# Patient Record
Sex: MALE | Race: WHITE | NOT HISPANIC OR LATINO | URBAN - METROPOLITAN AREA
[De-identification: names, ages, dates, MRNs, and addresses within clinical notes are randomized per-mention and may not be internally consistent; named-entity substitution may affect disease eponyms.]

---
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
## 2022-02-04 ENCOUNTER — APPOINTMENT (OUTPATIENT)
Dept: FAMILY MEDICINE | Facility: CLINIC | Age: 22
End: 2022-02-04
Payer: MEDICAID

## 2022-02-04 VITALS
DIASTOLIC BLOOD PRESSURE: 70 MMHG | HEIGHT: 69 IN | SYSTOLIC BLOOD PRESSURE: 122 MMHG | RESPIRATION RATE: 16 BRPM | HEART RATE: 105 BPM | BODY MASS INDEX: 31.7 KG/M2 | WEIGHT: 214 LBS | OXYGEN SATURATION: 97 %

## 2022-02-04 DIAGNOSIS — L01.00 IMPETIGO, UNSPECIFIED: ICD-10-CM

## 2022-02-04 DIAGNOSIS — H53.9 UNSPECIFIED VISUAL DISTURBANCE: ICD-10-CM

## 2022-02-04 DIAGNOSIS — Z83.2 FAMILY HISTORY OF DISEASES OF THE BLOOD AND BLOOD-FORMING ORGANS AND CERTAIN DISORDERS INVOLVING THE IMMUNE MECHANISM: ICD-10-CM

## 2022-02-04 DIAGNOSIS — K29.50 UNSPECIFIED CHRONIC GASTRITIS W/OUT BLEEDING: ICD-10-CM

## 2022-02-04 PROCEDURE — 99214 OFFICE O/P EST MOD 30 MIN: CPT

## 2022-02-04 RX ORDER — FAMOTIDINE 20 MG/1
20 TABLET, FILM COATED ORAL
Qty: 60 | Refills: 1 | Status: DISCONTINUED | COMMUNITY
Start: 2021-08-13 | End: 2022-02-04

## 2022-02-04 NOTE — REVIEW OF SYSTEMS
[Fever] : no fever [Earache] : no earache [Shortness Of Breath] : no shortness of breath [Abdominal Pain] : no abdominal pain [Heartburn] : heartburn

## 2022-02-04 NOTE — HISTORY OF PRESENT ILLNESS
[FreeTextEntry1] : Patient is here to get tb test  .  pt need form completed for jaida, had cpe in 7/21, pt saw cardio and given reassurance, pt was told cardio said likely gi  [de-identified] : pt w recent vision disturbance in both eyes, and headache, \par Patient with rash on chest after vigorous scratching area that was itchy

## 2022-03-07 LAB
DNA PLOIDY SPEC FC-IMP: NORMAL
M TB IFN-G BLD-IMP: NEGATIVE
QUANTIFERON TB PLUS MITOGEN MINUS NIL: 10 IU/ML
QUANTIFERON TB PLUS NIL: 0 IU/ML
QUANTIFERON TB PLUS TB1 MINUS NIL: 0 IU/ML
QUANTIFERON TB PLUS TB2 MINUS NIL: 0 IU/ML

## 2022-03-08 ENCOUNTER — APPOINTMENT (OUTPATIENT)
Dept: FAMILY MEDICINE | Facility: CLINIC | Age: 22
End: 2022-03-08
Payer: MEDICAID

## 2022-03-08 VITALS
DIASTOLIC BLOOD PRESSURE: 76 MMHG | RESPIRATION RATE: 12 BRPM | WEIGHT: 215 LBS | OXYGEN SATURATION: 94 % | HEART RATE: 92 BPM | TEMPERATURE: 97.8 F | SYSTOLIC BLOOD PRESSURE: 110 MMHG

## 2022-03-08 DIAGNOSIS — H53.9 UNSPECIFIED VISUAL DISTURBANCE: ICD-10-CM

## 2022-03-08 DIAGNOSIS — M79.89 OTHER SPECIFIED SOFT TISSUE DISORDERS: ICD-10-CM

## 2022-03-08 LAB
BILIRUB UR QL STRIP: NORMAL
GLUCOSE UR-MCNC: NORMAL
HCG UR QL: 0.2 EU/DL
HGB UR QL STRIP.AUTO: NORMAL
KETONES UR-MCNC: NORMAL
LEUKOCYTE ESTERASE UR QL STRIP: NORMAL
NITRITE UR QL STRIP: NORMAL
PH UR STRIP: 5.5
PROT UR STRIP-MCNC: NORMAL
SP GR UR STRIP: 1.03

## 2022-03-08 PROCEDURE — 81003 URINALYSIS AUTO W/O SCOPE: CPT | Mod: QW

## 2022-03-08 PROCEDURE — 99214 OFFICE O/P EST MOD 30 MIN: CPT

## 2022-03-08 NOTE — PHYSICAL EXAM
[No Acute Distress] : no acute distress [Supple] : supple [Clear to Auscultation] : lungs were clear to auscultation bilaterally [Normal S1, S2] : normal S1 and S2 [Soft] : abdomen soft [de-identified] : pos sweling in r lat abd wall , appears to be lipoma , soft

## 2022-03-08 NOTE — HISTORY OF PRESENT ILLNESS
[FreeTextEntry1] : Patient is here to follow up on lab results and lookingf for help sleeping,. pt note sproblem fall asleep, pt needs form fillled outr for helatrh screen, pt notes no cp, no sob, pt labs reveal tb was neg and factor 5 was hetero , ptmhas no personal hx of clots\par  [de-identified] : pt notes sonme r side abd sorenbess he has tenderness at times and last summer was referred to dr little to see if something could be done w it but he nevewr went

## 2022-03-11 LAB
APPEARANCE: CLEAR
BACTERIA: NEGATIVE
BILIRUBIN URINE: NEGATIVE
BLOOD URINE: NORMAL
COLOR: YELLOW
GLUCOSE QUALITATIVE U: NEGATIVE
HYALINE CASTS: 1 /LPF
KETONES URINE: NEGATIVE
LEUKOCYTE ESTERASE URINE: NEGATIVE
MICROSCOPIC-UA: NORMAL
NITRITE URINE: NEGATIVE
PH URINE: 5.5
PROTEIN URINE: NEGATIVE
RED BLOOD CELLS URINE: 1 /HPF
SPECIFIC GRAVITY URINE: 1.02
SQUAMOUS EPITHELIAL CELLS: 0 /HPF
UROBILINOGEN URINE: NORMAL
WHITE BLOOD CELLS URINE: 1 /HPF

## 2022-04-01 ENCOUNTER — RX RENEWAL (OUTPATIENT)
Age: 22
End: 2022-04-01

## 2022-05-11 NOTE — ED STATDOCS - TEMPLATE, MLM
TC to patient      PET shows uptake in the right hilar area with SUV 3.7 and \"numerous node in the right neck\". Dominant one is at level IIA , 12 mm, SUV 5.0. Clinically he has no new symptoms. Options are to repeat CT with contrast of neck and chest in 3 mo, or to biopsy one or both areas. Will discuss in lung TB on 5/18 and get back to him.
Abdominal pain

## 2022-05-12 ENCOUNTER — APPOINTMENT (OUTPATIENT)
Dept: SURGERY | Facility: CLINIC | Age: 22
End: 2022-05-12
Payer: MEDICAID

## 2022-05-12 VITALS
TEMPERATURE: 97.6 F | HEART RATE: 62 BPM | OXYGEN SATURATION: 96 % | SYSTOLIC BLOOD PRESSURE: 119 MMHG | HEIGHT: 69 IN | WEIGHT: 218 LBS | DIASTOLIC BLOOD PRESSURE: 79 MMHG | BODY MASS INDEX: 32.29 KG/M2

## 2022-05-12 PROCEDURE — 99203 OFFICE O/P NEW LOW 30 MIN: CPT

## 2022-05-12 NOTE — CONSULT LETTER
[Dear  ___] : Dear  [unfilled], [Consult Letter:] : I had the pleasure of evaluating your patient, [unfilled]. [Please see my note below.] : Please see my note below. [Consult Closing:] : Thank you very much for allowing me to participate in the care of this patient.  If you have any questions, please do not hesitate to contact me. [Sincerely,] : Sincerely, [FreeTextEntry3] : Campbell Thomas MD, FACS\par  \par Department of Surgery\par [facility]\par [facility]\par

## 2022-05-12 NOTE — PHYSICAL EXAM
[JVD] : no jugular venous distention  [Purpura] : no purpura  [Petechiae] : no petechiae [Skin Ulcer] : no ulcer [Skin Induration] : no induration [Alert] : alert [Oriented to Person] : oriented to person [Oriented to Place] : oriented to place [Oriented to Time] : oriented to time [Calm] : calm [de-identified] : non toxic, in no acute distress  [de-identified] : NC/AT PERRL EOMI no scleral icterus  [de-identified] : trachea midline, no gross mass  [de-identified] : no audible wheezing or stridor  [de-identified] : obese soft, no localizing tenderness, no guarding, no rebound,  [de-identified] : FROM  of all extremities with no gross deformity or angulation, there is no abdominal wall herniation  [de-identified] : there is a large rubbery semi mobile mass of the right lower abdominal wall that feels most like a lipoma  [de-identified] : mood is calm

## 2022-05-12 NOTE — ASSESSMENT
[FreeTextEntry1] : The patient is a 22 year old male with a mass of the right lower abdominal wall that is most likely a lipoma just above the anterior abdominal wall fascia.  The patient has been advised that given the disparity in the imaging already performed I will proceed with an MRI to further assess if this is consistent with a lipoma or potentially a more concerning neoplasm.  He and his mother are in agreement and will follow up upon completion of the MRi for further recommendations regarding excision. A total of 40 minutes was spent coordinating the patient's care.

## 2022-05-12 NOTE — HISTORY OF PRESENT ILLNESS
[de-identified] : The patient comes to the office in consultation by Dr. Mario Jauregui for evaluation of a mass of the right lower abdominal wall.  The patient reports that the mass has been present for at least 2 to 3 years.  The patient reports that at one point he had pain prompting him to go to the ER.  He had been experiencing a burning pain in the right lower abdominal area and at one point was worked up to rule out appendicitis.  Imaging at that time suggested fat necrosis or inflammation of the right lower abdominal wall.

## 2022-05-12 NOTE — DATA REVIEWED
[FreeTextEntry1] : Independent review of the abdominal ultrasound suggest a lipoma 9.7 by 4 by 9.4 cm in size \par \par Independent review of the abd/pel CT scan reveals an area of inflammation and/or fat necrosis of the right lower abdominal wall

## 2022-06-28 ENCOUNTER — APPOINTMENT (OUTPATIENT)
Dept: MRI IMAGING | Facility: CLINIC | Age: 22
End: 2022-06-28

## 2022-06-28 ENCOUNTER — OUTPATIENT (OUTPATIENT)
Dept: OUTPATIENT SERVICES | Facility: HOSPITAL | Age: 22
LOS: 1 days | End: 2022-06-28
Payer: MEDICAID

## 2022-06-28 DIAGNOSIS — Z00.8 ENCOUNTER FOR OTHER GENERAL EXAMINATION: ICD-10-CM

## 2022-06-28 PROCEDURE — 74183 MRI ABD W/O CNTR FLWD CNTR: CPT | Mod: 26

## 2022-06-28 PROCEDURE — 74183 MRI ABD W/O CNTR FLWD CNTR: CPT

## 2022-06-28 PROCEDURE — A9585: CPT

## 2022-07-11 ENCOUNTER — APPOINTMENT (OUTPATIENT)
Dept: SURGERY | Facility: CLINIC | Age: 22
End: 2022-07-11

## 2022-07-11 VITALS
HEIGHT: 69 IN | OXYGEN SATURATION: 98 % | HEART RATE: 72 BPM | WEIGHT: 221 LBS | DIASTOLIC BLOOD PRESSURE: 85 MMHG | BODY MASS INDEX: 32.73 KG/M2 | RESPIRATION RATE: 12 BRPM | TEMPERATURE: 97.1 F | SYSTOLIC BLOOD PRESSURE: 132 MMHG

## 2022-07-11 DIAGNOSIS — R19.03 RIGHT LOWER QUADRANT ABDOMINAL SWELLING, MASS AND LUMP: ICD-10-CM

## 2022-07-11 PROCEDURE — 99214 OFFICE O/P EST MOD 30 MIN: CPT

## 2022-07-11 NOTE — HISTORY OF PRESENT ILLNESS
[de-identified] : The patient comes to the office with no new complaints.  He has no pain and has noted no growth of the mass in the right lower abdominal wall. He has no abdominal pain, nausea, or vomit.

## 2022-07-11 NOTE — PHYSICAL EXAM
[JVD] : no jugular venous distention  [Purpura] : no purpura  [Petechiae] : no petechiae [Skin Ulcer] : no ulcer [Skin Induration] : no induration [Alert] : alert [Oriented to Person] : oriented to person [Oriented to Place] : oriented to place [Oriented to Time] : oriented to time [Calm] : calm [de-identified] : non toxic, in no acute distress  [de-identified] : NC/AT PERRL EOMI no scleral icterus  [de-identified] : trachea midline, no gross mass  [de-identified] : no audible wheezing or stridor  [de-identified] : obese soft, no localizing tenderness, no guarding, no rebound,  [de-identified] : FROM  of all extremities with no gross deformity or angulation, there is no abdominal wall herniation  [de-identified] : there remains a large rubbery semi mobile mass of the right lower abdominal wall that feels most like a lipoma  [de-identified] : mood is calm

## 2022-07-11 NOTE — DATA REVIEWED
[FreeTextEntry1] : Independent review of the abd/pel CT scan reveals a predominantly lipomatous mass of the right lower abdominal wall with enhancement and vascularity, most likely a lipoma however liposarcoma can not be excluded.

## 2022-07-11 NOTE — ASSESSMENT
[FreeTextEntry1] : The patient is a 22 year old male with a lipomatous mass of the right lower abdominal wall.  Based on MRI imaging, and presence of vascularity, liposarcoma can not be excluded.  I have suggested an evaluation with Surgical Oncology given the potential this may represent a liposarcoma.  The patient and his mother admitted understanding.  They will contact Surgical Oncology for evaluation.  A total of 30 minutes was spent coordinating the patient's care.

## 2022-07-21 ENCOUNTER — APPOINTMENT (OUTPATIENT)
Dept: SURGICAL ONCOLOGY | Facility: CLINIC | Age: 22
End: 2022-07-21

## 2022-07-21 VITALS
WEIGHT: 221 LBS | OXYGEN SATURATION: 96 % | BODY MASS INDEX: 32.73 KG/M2 | HEIGHT: 69 IN | HEART RATE: 62 BPM | SYSTOLIC BLOOD PRESSURE: 113 MMHG | DIASTOLIC BLOOD PRESSURE: 75 MMHG

## 2022-07-21 DIAGNOSIS — D17.9 BENIGN LIPOMATOUS NEOPLASM, UNSPECIFIED: ICD-10-CM

## 2022-07-21 PROCEDURE — 99203 OFFICE O/P NEW LOW 30 MIN: CPT

## 2022-07-22 NOTE — HISTORY OF PRESENT ILLNESS
[de-identified] : 22 year old male with history of right lower abdominal wall mass. He reports he initially noted it about 2-3 years ago. He reports having pain that did not resolve and reports presenting to ER. He reports he continues to have burning pain and had workup for appendicitis. During the work up he had CT on 2/22/20 and US done on 3/10/20 that showed Ultrasound overlying area of concern in the right lower quadrant demonstrates a subcutaneous well-circumscribed isoechoic lesion measuring 9.7 x 2.5 x 9.8 cm most suggestive of a lipoma corresponding to findings  on  recent CT.\par \par   He then followed up with general surgery Dr. Thomas and recommended to have MRI done.   He then had MRI done that showed The predominantly adipose mass measures 10.5 x 9.8 x 4.3 cm in craniocaudal, right/left and ap dimensions respectively. This lesion was seen on prior CT abdomen/pelvis dated 2/22/2020 and previously measured approximately 9.1 x 8.1 x 2.9 cm.  Redemonstration of a predominantly lipomatous mass in the right lateral abdominal wall which has slightly increased in size when compared to prior study dated 2020 accounting for differences in technique. The lesion demonstrates no signs of acute inflammation or septation and likely represents a lipoma, however given the prominent internal and peripheral vessels and the subtle enhancement within the mass as well as the interval growth, a well differentiated liposarcoma cannot be fully excluded.   \par \par He followed up with Dr. Thomas, he referred patient to Surg/ Onc. to discuss possible resection of the mass.  He denies having any recent fever, chills, n/v. He denies having any pain and reports he does not think the mass has increased in size.\par \par He reports he was recently diagnosed with Factor V deficiency. He got tested after his father and sister were diagnosed. He follows up with hematologist but unsure of the name. He is aware to obtain clearance from hematologist prior to the surgery.\par \par Patient sates she has been feeling well overall. Appetite has been good no unintentional weight loss.\par Denies any recent SOB, CP, fever, chills, n/v/d. \par \par He reports having hallucinations after taking Pepcid and he took seasonal allergy medication in the past and states it made him depressed. \par

## 2022-07-22 NOTE — ASSESSMENT
[FreeTextEntry1] : 22 year old man who reports 2 years of noticing a mass on the right lower quadrant of the abdominal wall. He reports that in the last several months it has grown. An MRI performed is consistent with either a lipoma or a low-grade liposarcoma, as are the physical exam findings. \par \par I d/w the patient and his mother the R/B/A's of surgical excision. We discussed the need for a wide excision to include a cuff or margin of normal tissue circumferentially. I explained in detail how this could include partial excision of the patient's muscular fascia or muscle fibers. We discussed possible sensory deficits in the area that could be long-lasting. We discussed a 4-6 week recovery where he would need to avoid strenuous activity. We discussed the possible need for a surgical drain for 1-2 weeks. We discussed risks of hematoma, seroma, wound-healing problems, recurrence, positive surgical margin. The patient and his mother expressed understanding and would like to proceed.

## 2022-07-22 NOTE — PHYSICAL EXAM
[Normal] : oriented to person, place and time, with appropriate affect [de-identified] : in the right lower quadrant of the abdomen, in the abdominal wall soft tissues, is a large, compressible, vaguely bounded mass c/w a lipoma of approximately 15cm, c/w either a lipoma or low grade liposarcoma

## 2022-08-03 ENCOUNTER — OUTPATIENT (OUTPATIENT)
Dept: OUTPATIENT SERVICES | Facility: HOSPITAL | Age: 22
LOS: 1 days | End: 2022-08-03
Payer: COMMERCIAL

## 2022-08-03 VITALS
DIASTOLIC BLOOD PRESSURE: 74 MMHG | WEIGHT: 220.02 LBS | OXYGEN SATURATION: 98 % | TEMPERATURE: 98 F | HEART RATE: 75 BPM | HEIGHT: 69 IN | SYSTOLIC BLOOD PRESSURE: 110 MMHG

## 2022-08-03 DIAGNOSIS — R19.03 RIGHT LOWER QUADRANT ABDOMINAL SWELLING, MASS AND LUMP: ICD-10-CM

## 2022-08-03 DIAGNOSIS — Z01.818 ENCOUNTER FOR OTHER PREPROCEDURAL EXAMINATION: ICD-10-CM

## 2022-08-03 DIAGNOSIS — Z29.9 ENCOUNTER FOR PROPHYLACTIC MEASURES, UNSPECIFIED: ICD-10-CM

## 2022-08-03 DIAGNOSIS — Z13.89 ENCOUNTER FOR SCREENING FOR OTHER DISORDER: ICD-10-CM

## 2022-08-03 DIAGNOSIS — Z98.890 OTHER SPECIFIED POSTPROCEDURAL STATES: Chronic | ICD-10-CM

## 2022-08-03 LAB
ALBUMIN SERPL ELPH-MCNC: 4.6 G/DL — SIGNIFICANT CHANGE UP (ref 3.3–5.2)
ALP SERPL-CCNC: 107 U/L — SIGNIFICANT CHANGE UP (ref 40–120)
ALT FLD-CCNC: 13 U/L — SIGNIFICANT CHANGE UP
ANION GAP SERPL CALC-SCNC: 11 MMOL/L — SIGNIFICANT CHANGE UP (ref 5–17)
APPEARANCE UR: CLEAR — SIGNIFICANT CHANGE UP
APTT BLD: 31.4 SEC — SIGNIFICANT CHANGE UP (ref 27.5–35.5)
AST SERPL-CCNC: 19 U/L — SIGNIFICANT CHANGE UP
BACTERIA # UR AUTO: NEGATIVE — SIGNIFICANT CHANGE UP
BASOPHILS # BLD AUTO: 0.03 K/UL — SIGNIFICANT CHANGE UP (ref 0–0.2)
BASOPHILS NFR BLD AUTO: 0.5 % — SIGNIFICANT CHANGE UP (ref 0–2)
BILIRUB SERPL-MCNC: 0.4 MG/DL — SIGNIFICANT CHANGE UP (ref 0.4–2)
BILIRUB UR-MCNC: NEGATIVE — SIGNIFICANT CHANGE UP
BLD GP AB SCN SERPL QL: SIGNIFICANT CHANGE UP
BUN SERPL-MCNC: 11.6 MG/DL — SIGNIFICANT CHANGE UP (ref 8–20)
CALCIUM SERPL-MCNC: 9.1 MG/DL — SIGNIFICANT CHANGE UP (ref 8.4–10.5)
CHLORIDE SERPL-SCNC: 103 MMOL/L — SIGNIFICANT CHANGE UP (ref 98–107)
CO2 SERPL-SCNC: 26 MMOL/L — SIGNIFICANT CHANGE UP (ref 22–29)
COLOR SPEC: YELLOW — SIGNIFICANT CHANGE UP
CREAT SERPL-MCNC: 0.87 MG/DL — SIGNIFICANT CHANGE UP (ref 0.5–1.3)
DIFF PNL FLD: ABNORMAL
EGFR: 127 ML/MIN/1.73M2 — SIGNIFICANT CHANGE UP
EOSINOPHIL # BLD AUTO: 0.06 K/UL — SIGNIFICANT CHANGE UP (ref 0–0.5)
EOSINOPHIL NFR BLD AUTO: 1 % — SIGNIFICANT CHANGE UP (ref 0–6)
EPI CELLS # UR: NEGATIVE — SIGNIFICANT CHANGE UP
GLUCOSE SERPL-MCNC: 86 MG/DL — SIGNIFICANT CHANGE UP (ref 70–99)
GLUCOSE UR QL: NEGATIVE MG/DL — SIGNIFICANT CHANGE UP
HCT VFR BLD CALC: 46.7 % — SIGNIFICANT CHANGE UP (ref 39–50)
HGB BLD-MCNC: 15.5 G/DL — SIGNIFICANT CHANGE UP (ref 13–17)
IMM GRANULOCYTES NFR BLD AUTO: 0.2 % — SIGNIFICANT CHANGE UP (ref 0–1.5)
INR BLD: 1 RATIO — SIGNIFICANT CHANGE UP (ref 0.88–1.16)
KETONES UR-MCNC: NEGATIVE — SIGNIFICANT CHANGE UP
LEUKOCYTE ESTERASE UR-ACNC: NEGATIVE — SIGNIFICANT CHANGE UP
LYMPHOCYTES # BLD AUTO: 1.79 K/UL — SIGNIFICANT CHANGE UP (ref 1–3.3)
LYMPHOCYTES # BLD AUTO: 30.9 % — SIGNIFICANT CHANGE UP (ref 13–44)
MCHC RBC-ENTMCNC: 29.6 PG — SIGNIFICANT CHANGE UP (ref 27–34)
MCHC RBC-ENTMCNC: 33.2 GM/DL — SIGNIFICANT CHANGE UP (ref 32–36)
MCV RBC AUTO: 89.1 FL — SIGNIFICANT CHANGE UP (ref 80–100)
MONOCYTES # BLD AUTO: 0.54 K/UL — SIGNIFICANT CHANGE UP (ref 0–0.9)
MONOCYTES NFR BLD AUTO: 9.3 % — SIGNIFICANT CHANGE UP (ref 2–14)
NEUTROPHILS # BLD AUTO: 3.36 K/UL — SIGNIFICANT CHANGE UP (ref 1.8–7.4)
NEUTROPHILS NFR BLD AUTO: 58.1 % — SIGNIFICANT CHANGE UP (ref 43–77)
NITRITE UR-MCNC: NEGATIVE — SIGNIFICANT CHANGE UP
PH UR: 6 — SIGNIFICANT CHANGE UP (ref 5–8)
PLATELET # BLD AUTO: 277 K/UL — SIGNIFICANT CHANGE UP (ref 150–400)
POTASSIUM SERPL-MCNC: 4.2 MMOL/L — SIGNIFICANT CHANGE UP (ref 3.5–5.3)
POTASSIUM SERPL-SCNC: 4.2 MMOL/L — SIGNIFICANT CHANGE UP (ref 3.5–5.3)
PROT SERPL-MCNC: 7.4 G/DL — SIGNIFICANT CHANGE UP (ref 6.6–8.7)
PROT UR-MCNC: NEGATIVE — SIGNIFICANT CHANGE UP
PROTHROM AB SERPL-ACNC: 11.6 SEC — SIGNIFICANT CHANGE UP (ref 10.5–13.4)
RBC # BLD: 5.24 M/UL — SIGNIFICANT CHANGE UP (ref 4.2–5.8)
RBC # FLD: 12.4 % — SIGNIFICANT CHANGE UP (ref 10.3–14.5)
RBC CASTS # UR COMP ASSIST: NEGATIVE /HPF — SIGNIFICANT CHANGE UP (ref 0–4)
SODIUM SERPL-SCNC: 140 MMOL/L — SIGNIFICANT CHANGE UP (ref 135–145)
SP GR SPEC: 1.02 — SIGNIFICANT CHANGE UP (ref 1.01–1.02)
UROBILINOGEN FLD QL: NEGATIVE MG/DL — SIGNIFICANT CHANGE UP
WBC # BLD: 5.79 K/UL — SIGNIFICANT CHANGE UP (ref 3.8–10.5)
WBC # FLD AUTO: 5.79 K/UL — SIGNIFICANT CHANGE UP (ref 3.8–10.5)
WBC UR QL: NEGATIVE /HPF — SIGNIFICANT CHANGE UP (ref 0–5)

## 2022-08-03 PROCEDURE — G0463: CPT

## 2022-08-03 RX ORDER — CEFAZOLIN SODIUM 1 G
2000 VIAL (EA) INJECTION ONCE
Refills: 0 | Status: DISCONTINUED | OUTPATIENT
Start: 2022-08-15 | End: 2022-08-30

## 2022-08-03 NOTE — H&P PST ADULT - HISTORY OF PRESENT ILLNESS
23 yo M PMH of right lower abdominal wall mass. He reports he initially noted it about 2-3 years ago. He reports having pain that did not resolve and reports presenting to ER. He reports he continues to have burning pain and had workup for appendicitis. During the workup he had CT on 2/22/2020 and US done on 3/10/2020. Ultrasound showed a subcutaneous well-circumscribed isoechoic lesion measuring 9.7 x 2.5 x 9.8 cm in the right lower quadrant most suggestive of a lipoma corresponding to findings on recent CT. He then followed up with general surgery Dr Thomas and had MRI done that showed predominantly adipose mass measuring 10.5 x 9.8 x 4.3 cm in craniocaudal, right/left and AP dimensions respectively. This lesion was seen on prior CT abdomen/pelvis dated 2/22/2020 and previously measured approximately 9.1 x 8.1 x 2.9 cm. Redemonstration of a predominantly lipomatous mass in the right lateral abdominal wall which has slightly increased in size when compared to prior study from 2020 accounting for differences in technique. The lesion demonstrates no signs of acute inflammation or septation and likely represents a lipoma, however given the prominent internal and peripheral vessels and the subtle enhancement within the mass as well as the interval growth, a well differentiated liposarcoma cannot be fully excluded. Patient reports he was recently diagnosed with Factor V deficiency. FHx of Factor V deficiency in father and sister. Patient sates he has been feeling well overall. Denies any recent pain, SOB, chest pain, fevers, chills, n/v/d/c, loss of appetite or unintentional wt loss. Preop assessment prior to radical resection soft tissue mass abdominal w/Dr Holmwitch scheduled for 8/15/2022   23 yo M PMH of Factor V deficiency, seasonal allergies, presents with c/o right lower abdominal wall mass. He reports he initially noted it about 2-3 years ago. He reports having pain that did not resolve, he presented to ER and had workup for appendicitis. During the workup he had CT on 2/22/2020 and US on 3/10/2020. Ultrasound showed a subcutaneous well-circumscribed isoechoic lesion measuring 9.7 x 2.5 x 9.8 cm in the right lower quadrant most suggestive of a lipoma corresponding to findings on CT. He followed up with general surgery Dr Thomas and had MRI done that showed predominantly adipose mass measuring 10.5 x 9.8 x 4.3 cm in craniocaudal, right/left and AP dimensions respectively. This lesion was seen on prior CT abdomen/pelvis dated 2/22/2020 and previously measured approximately 9.1 x 8.1 x 2.9 cm. Re-demonstration of a predominantly lipomatous mass in the right lateral abdominal wall which has slightly increased in size when compared to prior study from 2020 accounting for differences in technique. The lesion demonstrates no signs of acute inflammation or septation and likely represents a lipoma, however given the prominent internal and peripheral vessels and the subtle enhancement within the mass as well as the interval growth, a well differentiated liposarcoma cannot be fully excluded. Patient reports he was recently diagnosed with Factor V deficiency. FHx of Factor V deficiency in father and sister. Patient sates he has been feeling well overall. Denies any recent pain, SOB, chest pain, fevers, chills, n/v/d/c, loss of appetite or unintentional wt loss. Preop assessment prior to radical resection soft tissue mass abdominal wall w/Dr Turk scheduled for 8/15/2022

## 2022-08-03 NOTE — H&P PST ADULT - PROBLEM SELECTOR PLAN 1
preop assessment, hematology clearance pending, radical resection soft tissue mass abdominal w/Dr Turk scheduled for 8/15/2022 preop assessment, hematology clearance pending, radical resection soft tissue mass abdominal wall w/Dr Turk scheduled for 8/15/2022

## 2022-08-03 NOTE — H&P PST ADULT - PROBLEM SELECTOR PLAN 2
caprini score 8, high risk for dvt, SCD ordered, surgical team to assess for dvt prophylaxis caprini score 9, high risk for dvt, SCD ordered, surgical team to assess for dvt prophylaxis

## 2022-08-03 NOTE — H&P PST ADULT - ASSESSMENT
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 Preop assessment prior to radical resection soft tissue mass abdominal w/Dr Turk scheduled for 8/15/2022    Pt was educated on preop preparation with written and verbal instructions. Pt was informed to obtain clearance >3 days before surgery. Pt will review medications with PCP. Pt was educated on NSAIDs, multivitamins and herbals that increase the risk of bleeding and need to be stopped 7 days before procedure. Pt was educated on covid testing and covid prevention, i.e. social distancing, handwashing, mask wearing. Pt verbalized understanding of the above.     OPIOID RISK TOOL    LAUREL EACH BOX THAT APPLIES AND ADD TOTALS AT THE END    FAMILY HISTORY OF SUBSTANCE ABUSE                 FEMALE         MALE                                                Alcohol                             [  ]1 pt          [  ]3pts                                               Illegal Durgs                     [  ]2 pts        [  ]3pts                                               Rx Drugs                           [  ]4 pts        [  ]4 pts    PERSONAL HISTORY OF SUBSTANCE ABUSE                                                                                          Alcohol                             [  ]3 pts       [  ]3 pts                                               Illegal Drugs                     [  ]4 pts        [  ]4 pts                                               Rx Drugs                           [  ]5 pts        [  ]5 pts    AGE BETWEEN 16-45 YEARS                                      [  ]1 pt         [ x ]1 pt    HISTORY OF PREADOLESCENT   SEXUAL ABUSE                                                             [  ]3 pts        [  ]0pts    PSYCHOLOGICAL DISEASE                     ADD, OCD, Bipolar, Schizophrenia        [  ]2 pts         [  ]2 pts                      Depression                                               [  ]1 pt           [  ]1 pt           SCORING TOTAL   (add numbers and type here)              ( 1 )                                     A score of 3 or lower indicated LOW risk for future opioid abuse  A score of 4 to 7 indicated moderate risk for future opioid abuse  A score of 8 or higher indicates a high risk for opioid abuse    CAPRINI VTE 2.0 SCORE [CLOT updated 2019]    AGE RELATED RISK FACTORS                                                       MOBILITY RELATED FACTORS  [ ] Age 41-60 years                                            (1 Point)                    [ ] Bed rest                                                        (1 Point)  [ ] Age: 61-74 years                                           (2 Points)                  [ ] Plaster cast                                                   (2 Points)  [ ] Age= 75 years                                              (3 Points)                    [ ] Bed bound for more than 72 hours                 (2 Points)    DISEASE RELATED RISK FACTORS                                               GENDER SPECIFIC FACTORS  [ ] Edema in the lower extremities                       (1 Point)              [ ] Pregnancy                                                     (1 Point)  [ ] Varicose veins                                               (1 Point)                     [ ] Post-partum < 6 weeks                                   (1 Point)             [x ] BMI > 25 Kg/m2                                            (1 Point)                     [ ] Hormonal therapy  or oral contraception          (1 Point)                 [ ] Sepsis (in the previous month)                        (1 Point)               [ ] History of pregnancy complications                 (1 point)  [ ] Pneumonia or serious lung disease                                               [ ] Unexplained or recurrent                     (1 Point)           (in the previous month)                               (1 Point)  [ ] Abnormal pulmonary function test                     (1 Point)                 SURGERY RELATED RISK FACTORS  [ ] Acute myocardial infarction                              (1 Point)               [ ]  Section                                             (1 Point)  [ ] Congestive heart failure (in the previous month)  (1 Point)      [ ] Minor surgery                                                  (1 Point)   [ ] Inflammatory bowel disease                             (1 Point)               [ ] Arthroscopic surgery                                        (2 Points)  [ ] Central venous access                                      (2 Points)                [x ] General surgery lasting more than 45 minutes (2 points)  [x ] Malignancy- Present or previous                   (2 Points)                [ ] Elective arthroplasty                                         (5 points)    [ ] Stroke (in the previous month)                          (5 Points)                                                                                                                                                           HEMATOLOGY RELATED FACTORS                                                 TRAUMA RELATED RISK FACTORS  [ ] Prior episodes of VTE                                     (3 Points)                [ ] Fracture of the hip, pelvis, or leg                       (5 Points)  [ ] Positive family history for VTE                         (3 Points)             [ ] Acute spinal cord injury (in the previous month)  (5 Points)  [ ] Prothrombin 15955 A                                     (3 Points)               [ ] Paralysis  (less than 1 month)                             (5 Points)  [x ] Factor V Leiden                                             (3 Points)                  [ ] Multiple Trauma within 1 month                        (5 Points)  [ ] Lupus anticoagulants                                     (3 Points)                                                           [ ] Anticardiolipin antibodies                               (3 Points)                                                       [ ] High homocysteine in the blood                      (3 Points)                                             [ ] Other congenital or acquired thrombophilia      (3 Points)                                                [ ] Heparin induced thrombocytopenia                  (3 Points)                                     Total Score [    8      ] 21 yo M presents with c/o right lower abdominal wall mass. Preop assessment prior to radical resection soft tissue mass abdominal wall w/Dr Turk scheduled for 8/15/2022    Pt was educated on preop preparation with written and verbal instructions. Pt was informed to obtain hematology clearance >3 days before surgery. Pt was educated on NSAIDs, multivitamins and herbals that increase the risk of bleeding and need to be stopped 7 days before procedure. Pt was educated on covid testing and covid prevention, i.e. social distancing, handwashing, mask wearing. Pt verbalized understanding of the above.     OPIOID RISK TOOL    LAUREL EACH BOX THAT APPLIES AND ADD TOTALS AT THE END    FAMILY HISTORY OF SUBSTANCE ABUSE                 FEMALE         MALE                                                Alcohol                             [  ]1 pt          [  ]3pts                                               Illegal Durgs                     [  ]2 pts        [  ]3pts                                               Rx Drugs                           [  ]4 pts        [  ]4 pts    PERSONAL HISTORY OF SUBSTANCE ABUSE                                                                                          Alcohol                             [  ]3 pts       [  ]3 pts                                               Illegal Drugs                     [  ]4 pts        [  ]4 pts                                               Rx Drugs                           [  ]5 pts        [  ]5 pts    AGE BETWEEN 16-45 YEARS                                      [  ]1 pt         [ x ]1 pt    HISTORY OF PREADOLESCENT   SEXUAL ABUSE                                                             [  ]3 pts        [  ]0pts    PSYCHOLOGICAL DISEASE                     ADD, OCD, Bipolar, Schizophrenia        [  ]2 pts         [  ]2 pts                      Depression                                               [  ]1 pt           [  ]1 pt           SCORING TOTAL   (add numbers and type here)              ( 1 )                                     A score of 3 or lower indicated LOW risk for future opioid abuse  A score of 4 to 7 indicated moderate risk for future opioid abuse  A score of 8 or higher indicates a high risk for opioid abuse    CAPRINI VTE 2.0 SCORE [CLOT updated 2019]    AGE RELATED RISK FACTORS                                                       MOBILITY RELATED FACTORS  [ ] Age 41-60 years                                            (1 Point)                    [ ] Bed rest                                                        (1 Point)  [ ] Age: 61-74 years                                           (2 Points)                  [ ] Plaster cast                                                   (2 Points)  [ ] Age= 75 years                                              (3 Points)                    [ ] Bed bound for more than 72 hours                 (2 Points)    DISEASE RELATED RISK FACTORS                                               GENDER SPECIFIC FACTORS  [ ] Edema in the lower extremities                       (1 Point)              [ ] Pregnancy                                                     (1 Point)  [ ] Varicose veins                                               (1 Point)                     [ ] Post-partum < 6 weeks                                   (1 Point)             [x ] BMI > 25 Kg/m2                                            (1 Point)                     [ ] Hormonal therapy  or oral contraception          (1 Point)                 [ ] Sepsis (in the previous month)                        (1 Point)               [ ] History of pregnancy complications                 (1 point)  [ ] Pneumonia or serious lung disease                                               [ ] Unexplained or recurrent                     (1 Point)           (in the previous month)                               (1 Point)  [ ] Abnormal pulmonary function test                     (1 Point)                 SURGERY RELATED RISK FACTORS  [ ] Acute myocardial infarction                              (1 Point)               [ ]  Section                                             (1 Point)  [ ] Congestive heart failure (in the previous month)  (1 Point)      [ ] Minor surgery                                                  (1 Point)   [ ] Inflammatory bowel disease                             (1 Point)               [ ] Arthroscopic surgery                                        (2 Points)  [ ] Central venous access                                      (2 Points)                [x ] General surgery lasting more than 45 minutes (2 points)  [x ] Malignancy- Present or previous                   (2 Points)                [ ] Elective arthroplasty                                         (5 points)    [ ] Stroke (in the previous month)                          (5 Points)                                                                                                                                                           HEMATOLOGY RELATED FACTORS                                                 TRAUMA RELATED RISK FACTORS  [ ] Prior episodes of VTE                                     (3 Points)                [ ] Fracture of the hip, pelvis, or leg                       (5 Points)  [ ] Positive family history for VTE                         (3 Points)             [ ] Acute spinal cord injury (in the previous month)  (5 Points)  [ ] Prothrombin 48353 A                                     (3 Points)               [ ] Paralysis  (less than 1 month)                             (5 Points)  [x ] Factor V Leiden                                             (3 Points)                  [ ] Multiple Trauma within 1 month                        (5 Points)  [ ] Lupus anticoagulants                                     (3 Points)                                                           [ ] Anticardiolipin antibodies                               (3 Points)                                                       [ ] High homocysteine in the blood                      (3 Points)                                             [ ] Other congenital or acquired thrombophilia      (3 Points)                                                [ ] Heparin induced thrombocytopenia                  (3 Points)                                     Total Score [    8      ] 23 yo M presents with c/o right lower abdominal wall mass. Preop assessment prior to radical resection soft tissue mass abdominal wall w/Dr Turk scheduled for 8/15/2022    Pt was educated on preop preparation with written and verbal instructions. Pt was informed to obtain hematology clearance >3 days before surgery. Pt was educated on NSAIDs, multivitamins and herbals that increase the risk of bleeding and need to be stopped 7 days before procedure. Pt was educated on covid testing and covid prevention, i.e. social distancing, handwashing, mask wearing. Pt verbalized understanding of the above.     OPIOID RISK TOOL    LAUREL EACH BOX THAT APPLIES AND ADD TOTALS AT THE END    FAMILY HISTORY OF SUBSTANCE ABUSE                 FEMALE         MALE                                                Alcohol                             [  ]1 pt          [  ]3pts                                               Illegal Durgs                     [  ]2 pts        [  ]3pts                                               Rx Drugs                           [  ]4 pts        [  ]4 pts    PERSONAL HISTORY OF SUBSTANCE ABUSE                                                                                          Alcohol                             [  ]3 pts       [  ]3 pts                                               Illegal Drugs                     [  ]4 pts        [  ]4 pts                                               Rx Drugs                           [  ]5 pts        [  ]5 pts    AGE BETWEEN 16-45 YEARS                                      [  ]1 pt         [ x ]1 pt    HISTORY OF PREADOLESCENT   SEXUAL ABUSE                                                             [  ]3 pts        [  ]0pts    PSYCHOLOGICAL DISEASE                     ADD, OCD, Bipolar, Schizophrenia        [  ]2 pts         [  ]2 pts                      Depression                                               [  ]1 pt           [  ]1 pt           SCORING TOTAL   (add numbers and type here)              ( 1 )                                     A score of 3 or lower indicated LOW risk for future opioid abuse  A score of 4 to 7 indicated moderate risk for future opioid abuse  A score of 8 or higher indicates a high risk for opioid abuse    CAPRINI VTE 2.0 SCORE [CLOT updated 2019]    AGE RELATED RISK FACTORS                                                       MOBILITY RELATED FACTORS  [ ] Age 41-60 years                                            (1 Point)                    [ ] Bed rest                                                        (1 Point)  [ ] Age: 61-74 years                                           (2 Points)                  [ ] Plaster cast                                                   (2 Points)  [ ] Age= 75 years                                              (3 Points)                    [ ] Bed bound for more than 72 hours                 (2 Points)    DISEASE RELATED RISK FACTORS                                               GENDER SPECIFIC FACTORS  [ ] Edema in the lower extremities                       (1 Point)              [ ] Pregnancy                                                     (1 Point)  [ ] Varicose veins                                               (1 Point)                     [ ] Post-partum < 6 weeks                                   (1 Point)             [x ] BMI > 25 Kg/m2                                            (1 Point)                     [ ] Hormonal therapy  or oral contraception          (1 Point)                 [ ] Sepsis (in the previous month)                        (1 Point)               [ ] History of pregnancy complications                 (1 point)  [ ] Pneumonia or serious lung disease                                               [ ] Unexplained or recurrent                     (1 Point)           (in the previous month)                               (1 Point)  [ ] Abnormal pulmonary function test                     (1 Point)                 SURGERY RELATED RISK FACTORS  [ ] Acute myocardial infarction                              (1 Point)               [ ]  Section                                             (1 Point)  [ ] Congestive heart failure (in the previous month)  (1 Point)      [ ] Minor surgery                                                  (1 Point)   [ ] Inflammatory bowel disease                             (1 Point)               [ ] Arthroscopic surgery                                        (2 Points)  [ ] Central venous access                                      (2 Points)                [x ] General surgery lasting more than 45 minutes (2 points)  [ ] Malignancy- Present or previous                   (2 Points)                [ ] Elective arthroplasty                                         (5 points)    [ ] Stroke (in the previous month)                          (5 Points)                                                                                                                                                           HEMATOLOGY RELATED FACTORS                                                 TRAUMA RELATED RISK FACTORS  [ ] Prior episodes of VTE                                     (3 Points)                [ ] Fracture of the hip, pelvis, or leg                       (5 Points)  [ x] Positive family history for VTE                         (3 Points)             [ ] Acute spinal cord injury (in the previous month)  (5 Points)  [ ] Prothrombin 99007 A                                     (3 Points)               [ ] Paralysis  (less than 1 month)                             (5 Points)  [x ] Factor V Leiden                                             (3 Points)                  [ ] Multiple Trauma within 1 month                        (5 Points)  [ ] Lupus anticoagulants                                     (3 Points)                                                           [ ] Anticardiolipin antibodies                               (3 Points)                                                       [ ] High homocysteine in the blood                      (3 Points)                                             [ ] Other congenital or acquired thrombophilia      (3 Points)                                                [ ] Heparin induced thrombocytopenia                  (3 Points)                                     Total Score [    9      ]

## 2022-08-03 NOTE — H&P PST ADULT - NSICDXFAMILYHX_GEN_ALL_CORE_FT
FAMILY HISTORY:  FH: type 1 diabetes mellitus, cousin, great aunt    Father  Still living? Yes, Estimated age: Age Unknown  Family history of coronary artery disease in father, Age at diagnosis: Age Unknown  Family history of factor V deficiency, Age at diagnosis: Age Unknown    Sibling  Still living? Unknown  Family history of DVT, Age at diagnosis: Age Unknown  Family history of factor V deficiency, Age at diagnosis: Age Unknown    Grandparent  Still living? Unknown  FH: kidney cancer, Age at diagnosis: Age Unknown    Aunt  Still living? Unknown  FH: breast cancer, Age at diagnosis: Age Unknown  FH: colon cancer, Age at diagnosis: Age Unknown

## 2022-08-03 NOTE — H&P PST ADULT - NSICDXPASTMEDICALHX_GEN_ALL_CORE_FT
PAST MEDICAL HISTORY:  Environmental allergies     Factor V deficiency     Right lower quadrant abdominal swelling, mass and lump

## 2022-08-12 NOTE — ASU PATIENT PROFILE, ADULT - ABLE TO REACH PT
Pt c/o left-sided chest pain near her left breast that radiates down her left arm. Denies SOB, lightheadedness, or dizziness. Pt states she went to her PMD this morning due to her symptoms chest pain, BLE edema, and bilateral hand edema.  Pt states she had yes

## 2022-08-14 ENCOUNTER — TRANSCRIPTION ENCOUNTER (OUTPATIENT)
Age: 22
End: 2022-08-14

## 2022-08-15 ENCOUNTER — TRANSCRIPTION ENCOUNTER (OUTPATIENT)
Age: 22
End: 2022-08-15

## 2022-08-15 ENCOUNTER — RESULT REVIEW (OUTPATIENT)
Age: 22
End: 2022-08-15

## 2022-08-15 ENCOUNTER — OUTPATIENT (OUTPATIENT)
Dept: INPATIENT UNIT | Facility: HOSPITAL | Age: 22
LOS: 1 days | End: 2022-08-15
Payer: COMMERCIAL

## 2022-08-15 ENCOUNTER — APPOINTMENT (OUTPATIENT)
Dept: SURGICAL ONCOLOGY | Facility: HOSPITAL | Age: 22
End: 2022-08-15

## 2022-08-15 VITALS
OXYGEN SATURATION: 98 % | WEIGHT: 220.02 LBS | RESPIRATION RATE: 16 BRPM | TEMPERATURE: 97 F | SYSTOLIC BLOOD PRESSURE: 135 MMHG | DIASTOLIC BLOOD PRESSURE: 78 MMHG | HEIGHT: 69 IN | HEART RATE: 75 BPM

## 2022-08-15 VITALS
TEMPERATURE: 98 F | OXYGEN SATURATION: 100 % | SYSTOLIC BLOOD PRESSURE: 108 MMHG | DIASTOLIC BLOOD PRESSURE: 60 MMHG | RESPIRATION RATE: 20 BRPM | HEART RATE: 66 BPM

## 2022-08-15 DIAGNOSIS — R19.03 RIGHT LOWER QUADRANT ABDOMINAL SWELLING, MASS AND LUMP: ICD-10-CM

## 2022-08-15 DIAGNOSIS — Z98.890 OTHER SPECIFIED POSTPROCEDURAL STATES: Chronic | ICD-10-CM

## 2022-08-15 PROCEDURE — 88304 TISSUE EXAM BY PATHOLOGIST: CPT | Mod: 26

## 2022-08-15 PROCEDURE — 22905 RAD RESECT ABD TUMOR 5 CM/>: CPT

## 2022-08-15 PROCEDURE — 88304 TISSUE EXAM BY PATHOLOGIST: CPT

## 2022-08-15 PROCEDURE — 22905 RAD RESECT ABD TUMOR 5 CM/>: CPT | Mod: AS

## 2022-08-15 PROCEDURE — C9399: CPT

## 2022-08-15 RX ORDER — HYDROMORPHONE HYDROCHLORIDE 2 MG/ML
0.5 INJECTION INTRAMUSCULAR; INTRAVENOUS; SUBCUTANEOUS
Refills: 0 | Status: DISCONTINUED | OUTPATIENT
Start: 2022-08-15 | End: 2022-08-15

## 2022-08-15 RX ORDER — DIPHENHYDRAMINE HCL 50 MG
0 CAPSULE ORAL
Qty: 0 | Refills: 0 | DISCHARGE

## 2022-08-15 RX ORDER — CETIRIZINE HYDROCHLORIDE 10 MG/1
1 TABLET ORAL
Qty: 0 | Refills: 0 | DISCHARGE

## 2022-08-15 RX ORDER — ACETAMINOPHEN 500 MG
975 TABLET ORAL ONCE
Refills: 0 | Status: COMPLETED | OUTPATIENT
Start: 2022-08-15 | End: 2022-08-15

## 2022-08-15 RX ORDER — ACETAMINOPHEN 500 MG
2 TABLET ORAL
Qty: 0 | Refills: 0 | DISCHARGE

## 2022-08-15 RX ORDER — ONDANSETRON 8 MG/1
4 TABLET, FILM COATED ORAL ONCE
Refills: 0 | Status: DISCONTINUED | OUTPATIENT
Start: 2022-08-15 | End: 2022-08-15

## 2022-08-15 RX ORDER — SODIUM CHLORIDE 9 MG/ML
1000 INJECTION, SOLUTION INTRAVENOUS
Refills: 0 | Status: DISCONTINUED | OUTPATIENT
Start: 2022-08-15 | End: 2022-08-15

## 2022-08-15 RX ADMIN — Medication 975 MILLIGRAM(S): at 13:23

## 2022-08-15 RX ADMIN — Medication 975 MILLIGRAM(S): at 12:53

## 2022-08-15 NOTE — ASU DISCHARGE PLAN (ADULT/PEDIATRIC) - ASU DC SPECIAL INSTRUCTIONSFT
OK to shower day after surgery. Also on day after surgery, it is ok to removed dressing around drain.   Keep drain in place. Strip the drain daily and monitor output. Of output change/increases please call Dr. Turk's office. Please follow up with Dr. Turk in 2 weeks.  Do not pick at glue (Dermadon) around incision.

## 2022-08-15 NOTE — ASU DISCHARGE PLAN (ADULT/PEDIATRIC) - CARE PROVIDER_API CALL
Pascual Turk (MD)  Surgery  [address] [place] [address]  Phone: [phone]  Fax: [phone]  Follow Up Time: 2 weeks

## 2022-08-15 NOTE — BRIEF OPERATIVE NOTE - NSICDXBRIEFPROCEDURE_GEN_ALL_CORE_FT
PROCEDURES:  Radical resection, soft tissue tumor, abdominal wall; 5 cm or greater 15-Aug-2022 17:57:27  Anitha Gaspar

## 2022-08-15 NOTE — ASU DISCHARGE PLAN (ADULT/PEDIATRIC) - NS MD DC FALL RISK RISK
For information on Fall & Injury Prevention, visit: [email or web address] OR  [email or web address] OR  https://www.cdc.gov/steadi/patient.html

## 2022-08-18 PROBLEM — R19.03 RIGHT LOWER QUADRANT ABDOMINAL SWELLING, MASS AND LUMP: Chronic | Status: ACTIVE | Noted: 2022-08-03

## 2022-08-18 PROBLEM — D68.2 HEREDITARY DEFICIENCY OF OTHER CLOTTING FACTORS: Chronic | Status: ACTIVE | Noted: 2022-08-03

## 2022-08-24 ENCOUNTER — RESULT REVIEW (OUTPATIENT)
Age: 22
End: 2022-08-24

## 2022-08-25 LAB — SURGICAL PATHOLOGY STUDY: SIGNIFICANT CHANGE UP

## 2022-08-27 ENCOUNTER — EMERGENCY (EMERGENCY)
Facility: HOSPITAL | Age: 22
LOS: 1 days | Discharge: HOME | End: 2022-08-27
Attending: STUDENT IN AN ORGANIZED HEALTH CARE EDUCATION/TRAINING PROGRAM
Payer: COMMERCIAL

## 2022-08-27 VITALS
SYSTOLIC BLOOD PRESSURE: 126 MMHG | RESPIRATION RATE: 18 BRPM | DIASTOLIC BLOOD PRESSURE: 89 MMHG | WEIGHT: 220.02 LBS | OXYGEN SATURATION: 97 % | HEART RATE: 97 BPM | TEMPERATURE: 99 F | HEIGHT: 69 IN

## 2022-08-27 DIAGNOSIS — Z98.890 OTHER SPECIFIED POSTPROCEDURAL STATES: Chronic | ICD-10-CM

## 2022-08-27 LAB
ALBUMIN SERPL ELPH-MCNC: 4.5 G/DL — SIGNIFICANT CHANGE UP (ref 3.3–5.2)
ALP SERPL-CCNC: 110 U/L — SIGNIFICANT CHANGE UP (ref 40–120)
ALT FLD-CCNC: 31 U/L — SIGNIFICANT CHANGE UP
ANION GAP SERPL CALC-SCNC: 12 MMOL/L — SIGNIFICANT CHANGE UP (ref 5–17)
APPEARANCE UR: CLEAR — SIGNIFICANT CHANGE UP
APTT BLD: 31.4 SEC — SIGNIFICANT CHANGE UP (ref 27.5–35.5)
AST SERPL-CCNC: 19 U/L — SIGNIFICANT CHANGE UP
BACTERIA # UR AUTO: ABNORMAL
BASOPHILS # BLD AUTO: 0.03 K/UL — SIGNIFICANT CHANGE UP (ref 0–0.2)
BASOPHILS NFR BLD AUTO: 0.3 % — SIGNIFICANT CHANGE UP (ref 0–2)
BILIRUB SERPL-MCNC: 0.5 MG/DL — SIGNIFICANT CHANGE UP (ref 0.4–2)
BILIRUB UR-MCNC: NEGATIVE — SIGNIFICANT CHANGE UP
BUN SERPL-MCNC: 11.1 MG/DL — SIGNIFICANT CHANGE UP (ref 8–20)
CALCIUM SERPL-MCNC: 10 MG/DL — SIGNIFICANT CHANGE UP (ref 8.4–10.5)
CHLORIDE SERPL-SCNC: 97 MMOL/L — LOW (ref 98–107)
CO2 SERPL-SCNC: 27 MMOL/L — SIGNIFICANT CHANGE UP (ref 22–29)
COLOR SPEC: YELLOW — SIGNIFICANT CHANGE UP
CREAT SERPL-MCNC: 0.85 MG/DL — SIGNIFICANT CHANGE UP (ref 0.5–1.3)
DIFF PNL FLD: ABNORMAL
EGFR: 128 ML/MIN/1.73M2 — SIGNIFICANT CHANGE UP
EOSINOPHIL # BLD AUTO: 0.07 K/UL — SIGNIFICANT CHANGE UP (ref 0–0.5)
EOSINOPHIL NFR BLD AUTO: 0.8 % — SIGNIFICANT CHANGE UP (ref 0–6)
EPI CELLS # UR: SIGNIFICANT CHANGE UP
GLUCOSE SERPL-MCNC: 89 MG/DL — SIGNIFICANT CHANGE UP (ref 70–99)
GLUCOSE UR QL: NEGATIVE MG/DL — SIGNIFICANT CHANGE UP
HCT VFR BLD CALC: 48.8 % — SIGNIFICANT CHANGE UP (ref 39–50)
HGB BLD-MCNC: 16 G/DL — SIGNIFICANT CHANGE UP (ref 13–17)
IMM GRANULOCYTES NFR BLD AUTO: 0.3 % — SIGNIFICANT CHANGE UP (ref 0–1.5)
INR BLD: 1.04 RATIO — SIGNIFICANT CHANGE UP (ref 0.88–1.16)
KETONES UR-MCNC: NEGATIVE — SIGNIFICANT CHANGE UP
LEUKOCYTE ESTERASE UR-ACNC: NEGATIVE — SIGNIFICANT CHANGE UP
LYMPHOCYTES # BLD AUTO: 2.16 K/UL — SIGNIFICANT CHANGE UP (ref 1–3.3)
LYMPHOCYTES # BLD AUTO: 23.7 % — SIGNIFICANT CHANGE UP (ref 13–44)
MCHC RBC-ENTMCNC: 29.1 PG — SIGNIFICANT CHANGE UP (ref 27–34)
MCHC RBC-ENTMCNC: 32.8 GM/DL — SIGNIFICANT CHANGE UP (ref 32–36)
MCV RBC AUTO: 88.7 FL — SIGNIFICANT CHANGE UP (ref 80–100)
MONOCYTES # BLD AUTO: 0.69 K/UL — SIGNIFICANT CHANGE UP (ref 0–0.9)
MONOCYTES NFR BLD AUTO: 7.6 % — SIGNIFICANT CHANGE UP (ref 2–14)
NEUTROPHILS # BLD AUTO: 6.15 K/UL — SIGNIFICANT CHANGE UP (ref 1.8–7.4)
NEUTROPHILS NFR BLD AUTO: 67.3 % — SIGNIFICANT CHANGE UP (ref 43–77)
NITRITE UR-MCNC: NEGATIVE — SIGNIFICANT CHANGE UP
PH UR: 6 — SIGNIFICANT CHANGE UP (ref 5–8)
PLATELET # BLD AUTO: 301 K/UL — SIGNIFICANT CHANGE UP (ref 150–400)
POTASSIUM SERPL-MCNC: 4.1 MMOL/L — SIGNIFICANT CHANGE UP (ref 3.5–5.3)
POTASSIUM SERPL-SCNC: 4.1 MMOL/L — SIGNIFICANT CHANGE UP (ref 3.5–5.3)
PROT SERPL-MCNC: 7.5 G/DL — SIGNIFICANT CHANGE UP (ref 6.6–8.7)
PROT UR-MCNC: NEGATIVE — SIGNIFICANT CHANGE UP
PROTHROM AB SERPL-ACNC: 12.1 SEC — SIGNIFICANT CHANGE UP (ref 10.5–13.4)
RBC # BLD: 5.5 M/UL — SIGNIFICANT CHANGE UP (ref 4.2–5.8)
RBC # FLD: 12 % — SIGNIFICANT CHANGE UP (ref 10.3–14.5)
RBC CASTS # UR COMP ASSIST: SIGNIFICANT CHANGE UP /HPF (ref 0–4)
SODIUM SERPL-SCNC: 136 MMOL/L — SIGNIFICANT CHANGE UP (ref 135–145)
SP GR SPEC: 1.02 — SIGNIFICANT CHANGE UP (ref 1.01–1.02)
UROBILINOGEN FLD QL: NEGATIVE MG/DL — SIGNIFICANT CHANGE UP
WBC # BLD: 9.13 K/UL — SIGNIFICANT CHANGE UP (ref 3.8–10.5)
WBC # FLD AUTO: 9.13 K/UL — SIGNIFICANT CHANGE UP (ref 3.8–10.5)
WBC UR QL: SIGNIFICANT CHANGE UP /HPF (ref 0–5)

## 2022-08-27 PROCEDURE — 99285 EMERGENCY DEPT VISIT HI MDM: CPT

## 2022-08-27 NOTE — ED PROVIDER NOTE - NSFOLLOWUPINSTRUCTIONS_ED_ALL_ED_FT
Follow up with your surgeon  May take tylenol or motrin as needed for pain  Return to the emergency room for increased pain, fever or other concerns

## 2022-08-27 NOTE — ED PROVIDER NOTE - PATIENT PORTAL LINK FT
You can access the FollowMyHealth Patient Portal offered by [facility] by registering at the following website: [email or web address]. By joining [facility]’s FollowMyHealth portal, you will also be able to view your health information using other applications (apps) compatible with our system.

## 2022-08-27 NOTE — ED PROVIDER NOTE - NS ED ROS FT
General: No fever, chills.  Respiratory: No SOB  Cardiac: No chest pain  GI: + abdominal pain, no nausea, vomiting  Neuro: + headache (hx of migraines)  MSK: No muscle pain, joint pain, back pain.  Psych: No known mental health issues.  Endocrine: No heat/cold intolerance, no polyuria/polydipsia.  Heme: +factor V deficiency  Allergic: No pruritis, dermatitis, or environmental allergies.

## 2022-08-27 NOTE — ED ADULT NURSE NOTE - NSIMPLEMENTINTERV_GEN_ALL_ED
Implemented All Universal Safety Interventions:  [place] to call system. Call bell, personal items and telephone within reach. Instruct patient to call for assistance. Room bathroom lighting operational. Non-slip footwear when patient is off stretcher. Physically safe environment: no spills, clutter or unnecessary equipment. Stretcher in lowest position, wheels locked, appropriate side rails in place.

## 2022-08-27 NOTE — ED PROVIDER NOTE - ATTENDING CONTRIBUTION TO CARE
Paramjit WISEMAN: I performed a face to face evaluation of this patient and performed a full history and physical examination on the patient.  I agree with the resident's history, physical examination, and plan of the patient unless otherwise noted. My brief assessment is as follows:    22y M presenting for abdominal pain s/p recent lipoma removal from RLQ. On exam, pt well appearing and in no distress. RLQ surgical scar clean, dry, intact. +Mild tenderness medial to scar. RAMIREZ drain with serosanguinous fluid. Labs, CT unremarkable. Surgery consulted; pt okay for discharge with outpatient f/u with his surgeon.

## 2022-08-27 NOTE — ED PROVIDER NOTE - OBJECTIVE STATEMENT
Patient is a 23yo M with PMHx Factor V Leiden deficiency who is 2 wks post op lipoma removal from RL who presents to the ED with periumbilical pain since last night. The pain sharp, intermittent, gets better with tylenol or motrin. He also had a small piece of tissue/clot come out of the drain yesterday. He has not had any fevers or chills, no nausea or vomiting, no diarrhea or dark stools. Had two regular BMs earlier today. RAMIREZ drain in place, has had decreasing amounts of serosanguinous drainage from RAMIREZ drain, about 5cc daily. No purulent drainage or erythema or pain around surgical site or drain site.

## 2022-08-27 NOTE — ED STATDOCS - PROGRESS NOTE DETAILS
Chas Martinez for ED attending, Dr. Stephens. 23 y/o male with PMHx of factor 5 deficiency, lipoma removal with J-tube placed presents with worsening abdominal pain, bloody discharge. Pt will be placed in the main ED for complete evaluation by another provider.

## 2022-08-27 NOTE — ED ADULT TRIAGE NOTE - CHIEF COMPLAINT QUOTE
Pt states he had lipoma removed to RLQ abd on 8/15, pt started having pain to abd today.  Pt has RAMIREZ in place near incision site.  No fevers

## 2022-08-27 NOTE — ED ADULT NURSE NOTE - OBJECTIVE STATEMENT
Pt comes in complaining of R sided abdominal pain since last night. Pt recently had sx to have lipoma removed 2 weeks ago. Scar on R abdomen open to air. Scar dry and intact. JPP drain in place. Pt denies any other medical symptoms.

## 2022-08-27 NOTE — ED PROVIDER NOTE - CLINICAL SUMMARY MEDICAL DECISION MAKING FREE TEXT BOX
Patient is a 23yo M with PMHx Factor V Leiden deficiency who is 2 wks post op lipoma removal from RLQ who presents to the ED with periumbilical pain since last night. CBC, CMP, coags, and CT Abd Pel with contrast obtained.

## 2022-08-27 NOTE — ED PROVIDER NOTE - CARE PROVIDER_API CALL
Pascual Turk (MD)  Surgery  [address] [place] [address]  Phone: [phone]  Fax: [phone]  Follow Up Time:

## 2022-08-28 VITALS
DIASTOLIC BLOOD PRESSURE: 86 MMHG | HEART RATE: 89 BPM | RESPIRATION RATE: 18 BRPM | TEMPERATURE: 98 F | OXYGEN SATURATION: 98 % | SYSTOLIC BLOOD PRESSURE: 125 MMHG

## 2022-08-28 PROCEDURE — 85610 PROTHROMBIN TIME: CPT

## 2022-08-28 PROCEDURE — 85730 THROMBOPLASTIN TIME PARTIAL: CPT

## 2022-08-28 PROCEDURE — 74177 CT ABD & PELVIS W/CONTRAST: CPT | Mod: MG

## 2022-08-28 PROCEDURE — 74177 CT ABD & PELVIS W/CONTRAST: CPT | Mod: 26,MG

## 2022-08-28 PROCEDURE — 87086 URINE CULTURE/COLONY COUNT: CPT

## 2022-08-28 PROCEDURE — 81001 URINALYSIS AUTO W/SCOPE: CPT

## 2022-08-28 PROCEDURE — 36415 COLL VENOUS BLD VENIPUNCTURE: CPT

## 2022-08-28 PROCEDURE — 80053 COMPREHEN METABOLIC PANEL: CPT

## 2022-08-28 PROCEDURE — 85025 COMPLETE CBC W/AUTO DIFF WBC: CPT

## 2022-08-28 PROCEDURE — G1004: CPT

## 2022-08-28 PROCEDURE — 99284 EMERGENCY DEPT VISIT MOD MDM: CPT | Mod: 25

## 2022-08-28 NOTE — CONSULT NOTE ADULT - SUBJECTIVE AND OBJECTIVE BOX
Patient is a 22yoM with PMH factor V leiden who presents to the ED with periumbilical pain that began last night. He mentions some RLQ pain since surgery that has not been improving. He otherwise denies headache, chest pain, SOB, nausea, vomiting. Patient mentions drain has been putting out <25cc of serosanguineous output per day.     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    PMH: factor V leiden  PSH: RLQ lipoma excision 8/15  Meds: denies  Allergies: NKDA  SH: denies illicit drug use x3    Vitals:  Vital Signs Last 24 Hrs  T(C): 36.4 (28 Aug 2022 05:47), Max: 37.1 (27 Aug 2022 17:32)  T(F): 97.5 (28 Aug 2022 05:47), Max: 98.7 (27 Aug 2022 17:32)  HR: 89 (28 Aug 2022 05:47) (89 - 97)  BP: 125/86 (28 Aug 2022 05:47) (125/86 - 130/92)  BP(mean): --  RR: 18 (28 Aug 2022 05:47) (18 - 18)  SpO2: 98% (28 Aug 2022 05:47) (97% - 98%)    Parameters below as of 28 Aug 2022 05:47  Patient On (Oxygen Delivery Method): room air    Labs:  08-27    136  |  97<L>  |  11.1  ----------------------------<  89  4.1   |  27.0  |  0.85    Ca    10.0      27 Aug 2022 19:55    TPro  7.5  /  Alb  4.5  /  TBili  0.5  /  DBili  x   /  AST  19  /  ALT  31  /  AlkPhos  110  08-27                            16.0   9.13  )-----------( 301      ( 27 Aug 2022 19:55 )             48.8     Physical Exam:  Gen: pt lying in bed, alert, in NAD  Resp: unlabored  CVS: RRR  Abd: soft, NT, ND, RLQ incision healing well with dermabond in place, RAMIREZ drain in place with serosanguineous output  Ext: moving all extremities spontaneously, sensation intact, pulses 2+

## 2022-08-28 NOTE — CONSULT NOTE ADULT - ASSESSMENT
Patient is a 22yoM with PMH factor V leiden who presents to the ED with periumbilical pain that began last night. He mentions some RLQ pain since surgery that has not been improving. He otherwise denies headache, chest pain, SOB, nausea, vomiting.     In the ED, patient is afebrile and HD stable, without leukocytosis or neutrophilic shift. CT A/P performed which is negative for any acute findings.    Plan:  -patient can follow-up with Dr. Turk in the office this week  -no surgical intervention indicated at this time  -drain stripped  -dispo per ED  -discussed with attending surgeon

## 2022-08-29 LAB
CULTURE RESULTS: NO GROWTH — SIGNIFICANT CHANGE UP
SPECIMEN SOURCE: SIGNIFICANT CHANGE UP

## 2022-09-01 ENCOUNTER — APPOINTMENT (OUTPATIENT)
Dept: SURGICAL ONCOLOGY | Facility: CLINIC | Age: 22
End: 2022-09-01

## 2022-09-01 PROCEDURE — 99024 POSTOP FOLLOW-UP VISIT: CPT

## 2022-09-07 ENCOUNTER — NON-APPOINTMENT (OUTPATIENT)
Age: 22
End: 2022-09-07

## 2022-12-09 ENCOUNTER — TRANSCRIPTION ENCOUNTER (OUTPATIENT)
Age: 22
End: 2022-12-09

## 2022-12-31 ENCOUNTER — APPOINTMENT (OUTPATIENT)
Dept: FAMILY MEDICINE | Facility: CLINIC | Age: 22
End: 2022-12-31
Payer: MEDICAID

## 2022-12-31 VITALS
HEART RATE: 92 BPM | WEIGHT: 199 LBS | HEIGHT: 69 IN | OXYGEN SATURATION: 98 % | TEMPERATURE: 97.6 F | BODY MASS INDEX: 29.47 KG/M2 | SYSTOLIC BLOOD PRESSURE: 126 MMHG | DIASTOLIC BLOOD PRESSURE: 84 MMHG | RESPIRATION RATE: 16 BRPM

## 2022-12-31 DIAGNOSIS — K29.00 ACUTE GASTRITIS W/OUT BLEEDING: ICD-10-CM

## 2022-12-31 DIAGNOSIS — Z86.59 PERSONAL HISTORY OF OTHER MENTAL AND BEHAVIORAL DISORDERS: ICD-10-CM

## 2022-12-31 PROCEDURE — 99215 OFFICE O/P EST HI 40 MIN: CPT | Mod: 25

## 2022-12-31 NOTE — HEALTH RISK ASSESSMENT
[PHQ-9 Positive] : PHQ-9 Positive [I have developed a follow-up plan documented below in the note.] : I have developed a follow-up plan documented below in the note. [de-identified] : phq9 of 12 today

## 2022-12-31 NOTE — PHYSICAL EXAM
[No Acute Distress] : no acute distress [Clear to Auscultation] : lungs were clear to auscultation bilaterally [Normal S1, S2] : normal S1 and S2 [Soft] : abdomen soft [Non Tender] : non-tender [Non-distended] : non-distended

## 2022-12-31 NOTE — REVIEW OF SYSTEMS
[Fever] : no fever [Earache] : no earache [Chest Pain] : no chest pain [Shortness Of Breath] : no shortness of breath [Nausea] : nausea [Suicidal] : not suicidal [FreeTextEntry9] : recent fracture of foot  [de-identified] : mood is a little down w out of work w foot, dealing w workers comp

## 2022-12-31 NOTE — HISTORY OF PRESENT ILLNESS
[FreeTextEntry8] :  pt present s for eval of Weight loss-  pt notes some diet changes , has been eating less , pt had a cyst removed over the summer, pt w hxof nausea and gastriitis and did not joselito the pepcid , no mention of issue w omep given couiple of years ago \par pt appetite has been down , pt mood is as well , pt had bought of depression , pt  now phq9 was 6 ,now 12, pt has hx of  nausea and ands was on zofran in past\par pt thinks he would benefit from psych- pt

## 2023-01-03 LAB
ALBUMIN SERPL ELPH-MCNC: 5 G/DL
ALP BLD-CCNC: 113 U/L
ALT SERPL-CCNC: 52 U/L
AMYLASE/CREAT SERPL: 120 U/L
ANION GAP SERPL CALC-SCNC: 12 MMOL/L
AST SERPL-CCNC: 21 U/L
BASOPHILS # BLD AUTO: 0.02 K/UL
BASOPHILS NFR BLD AUTO: 0.3 %
BILIRUB SERPL-MCNC: 1.1 MG/DL
BUN SERPL-MCNC: 9 MG/DL
CALCIUM SERPL-MCNC: 10.2 MG/DL
CHLORIDE SERPL-SCNC: 102 MMOL/L
CHOLEST SERPL-MCNC: 143 MG/DL
CO2 SERPL-SCNC: 26 MMOL/L
CREAT SERPL-MCNC: 0.85 MG/DL
EGFR: 128 ML/MIN/1.73M2
EOSINOPHIL # BLD AUTO: 0.09 K/UL
EOSINOPHIL NFR BLD AUTO: 1.6 %
GLUCOSE SERPL-MCNC: 92 MG/DL
HCT VFR BLD CALC: 49.2 %
HDLC SERPL-MCNC: 33 MG/DL
HGB BLD-MCNC: 15.9 G/DL
IMM GRANULOCYTES NFR BLD AUTO: 0.2 %
LDLC SERPL CALC-MCNC: 77 MG/DL
LPL SERPL-CCNC: 27 U/L
LYMPHOCYTES # BLD AUTO: 1.37 K/UL
LYMPHOCYTES NFR BLD AUTO: 24 %
MAN DIFF?: NORMAL
MCHC RBC-ENTMCNC: 29.2 PG
MCHC RBC-ENTMCNC: 32.3 GM/DL
MCV RBC AUTO: 90.4 FL
MONOCYTES # BLD AUTO: 0.45 K/UL
MONOCYTES NFR BLD AUTO: 7.9 %
NEUTROPHILS # BLD AUTO: 3.78 K/UL
NEUTROPHILS NFR BLD AUTO: 66 %
NONHDLC SERPL-MCNC: 110 MG/DL
PLATELET # BLD AUTO: 306 K/UL
POTASSIUM SERPL-SCNC: 4.5 MMOL/L
PROT SERPL-MCNC: 7.6 G/DL
RBC # BLD: 5.44 M/UL
RBC # FLD: 12.7 %
SODIUM SERPL-SCNC: 141 MMOL/L
TRIGL SERPL-MCNC: 163 MG/DL
TSH SERPL-ACNC: 1.04 UIU/ML
WBC # FLD AUTO: 5.72 K/UL

## 2023-01-06 ENCOUNTER — TRANSCRIPTION ENCOUNTER (OUTPATIENT)
Age: 23
End: 2023-01-06

## 2023-01-23 ENCOUNTER — TRANSCRIPTION ENCOUNTER (OUTPATIENT)
Age: 23
End: 2023-01-23

## 2023-02-01 ENCOUNTER — APPOINTMENT (OUTPATIENT)
Dept: FAMILY MEDICINE | Facility: CLINIC | Age: 23
End: 2023-02-01
Payer: MEDICAID

## 2023-02-01 VITALS
DIASTOLIC BLOOD PRESSURE: 76 MMHG | TEMPERATURE: 98.3 F | OXYGEN SATURATION: 98 % | WEIGHT: 198 LBS | HEIGHT: 69 IN | SYSTOLIC BLOOD PRESSURE: 122 MMHG | BODY MASS INDEX: 29.33 KG/M2 | HEART RATE: 81 BPM

## 2023-02-01 DIAGNOSIS — D68.51 ACTIVATED PROTEIN C RESISTANCE: ICD-10-CM

## 2023-02-01 DIAGNOSIS — K64.4 RESIDUAL HEMORRHOIDAL SKIN TAGS: ICD-10-CM

## 2023-02-01 PROCEDURE — 99214 OFFICE O/P EST MOD 30 MIN: CPT

## 2023-02-01 NOTE — HEALTH RISK ASSESSMENT
[No] : No [0] : 2) Feeling down, depressed, or hopeless: Not at all (0) [PHQ-2 Negative - No further assessment needed] : PHQ-2 Negative - No further assessment needed

## 2023-02-01 NOTE — HISTORY OF PRESENT ILLNESS
[FreeTextEntry8] : Pt complains of hemorrhoids x two weeks. pt notes no bleed, dull pain,. pt notes takes long time in bathroom , pt mom thinks he had since birth, pt notes has gotten  sharp pain , and been using witch hazel and tucks pads, has helped, pt notes blood per rectum.  Patient locates area of soreness on the left side.\par Patient also requesting behavioral health consult as did not hear from team regarding prior concerns.  Patient notes no ideation and no plan.  Patient here with mother who is supportive.  We will rerequest consult

## 2023-02-01 NOTE — DATA REVIEWED
[FreeTextEntry1] : Reviewed hematology consult from December 2022 and no treatment needed for the factor V Mom concurred hematology said that they could do a baby aspirin as I had recommended

## 2023-02-01 NOTE — PHYSICAL EXAM
[No Acute Distress] : no acute distress [Supple] : supple [Clear to Auscultation] : lungs were clear to auscultation bilaterally [Normal S1, S2] : normal S1 and S2 [Normal TMs] : both tympanic membranes were normal [FreeTextEntry1] : pos external hemorrhoid, size of grape on left, nontender , did not pop

## 2023-02-17 ENCOUNTER — TRANSCRIPTION ENCOUNTER (OUTPATIENT)
Age: 23
End: 2023-02-17

## 2023-02-22 ENCOUNTER — TRANSCRIPTION ENCOUNTER (OUTPATIENT)
Age: 23
End: 2023-02-22

## 2023-03-20 ENCOUNTER — TRANSCRIPTION ENCOUNTER (OUTPATIENT)
Age: 23
End: 2023-03-20

## 2023-03-22 ENCOUNTER — TRANSCRIPTION ENCOUNTER (OUTPATIENT)
Age: 23
End: 2023-03-22

## 2023-03-28 ENCOUNTER — RX RENEWAL (OUTPATIENT)
Age: 23
End: 2023-03-28

## 2023-05-22 ENCOUNTER — TRANSCRIPTION ENCOUNTER (OUTPATIENT)
Age: 23
End: 2023-05-22

## 2023-05-29 NOTE — COUNSELING
[Good understanding] : Patient has a good understanding of lifestyle changes and the steps needed to achieve self management goals Impaired gait

## 2023-05-30 ENCOUNTER — TRANSCRIPTION ENCOUNTER (OUTPATIENT)
Age: 23
End: 2023-05-30

## 2023-06-01 ENCOUNTER — APPOINTMENT (OUTPATIENT)
Dept: FAMILY MEDICINE | Facility: CLINIC | Age: 23
End: 2023-06-01
Payer: MEDICAID

## 2023-06-01 VITALS
TEMPERATURE: 98 F | SYSTOLIC BLOOD PRESSURE: 120 MMHG | OXYGEN SATURATION: 98 % | RESPIRATION RATE: 16 BRPM | WEIGHT: 201 LBS | HEART RATE: 96 BPM | DIASTOLIC BLOOD PRESSURE: 90 MMHG | BODY MASS INDEX: 29.77 KG/M2 | HEIGHT: 69 IN

## 2023-06-01 DIAGNOSIS — G43.909 MIGRAINE, UNSPECIFIED, NOT INTRACTABLE, W/OUT STATUS MIGRAINOSUS: ICD-10-CM

## 2023-06-01 PROCEDURE — 99214 OFFICE O/P EST MOD 30 MIN: CPT

## 2023-06-01 NOTE — HISTORY OF PRESENT ILLNESS
[Other: _____] : [unfilled] [FreeTextEntry8] : Patient c/o difficulty staying  asleep which started 3 years ago.pt finally did connect for behavioral help, pt was referred to some officews but pt has not had motivation to do so and thus we are in same spot, as other recent visits, pt has anxiety and depressiuon and no ideation, regarding sleep both issuies fall and stay asleep\par

## 2023-07-17 ENCOUNTER — APPOINTMENT (OUTPATIENT)
Dept: FAMILY MEDICINE | Facility: CLINIC | Age: 23
End: 2023-07-17
Payer: MEDICAID

## 2023-07-17 VITALS
WEIGHT: 203 LBS | SYSTOLIC BLOOD PRESSURE: 124 MMHG | DIASTOLIC BLOOD PRESSURE: 82 MMHG | TEMPERATURE: 97.3 F | OXYGEN SATURATION: 98 % | HEIGHT: 69 IN | HEART RATE: 89 BPM | BODY MASS INDEX: 30.07 KG/M2

## 2023-07-17 DIAGNOSIS — G47.00 INSOMNIA, UNSPECIFIED: ICD-10-CM

## 2023-07-17 DIAGNOSIS — K13.0 DISEASES OF LIPS: ICD-10-CM

## 2023-07-17 PROCEDURE — 99214 OFFICE O/P EST MOD 30 MIN: CPT

## 2023-07-17 NOTE — HEALTH RISK ASSESSMENT
[Several Days (1)] : 4.) Feeling tired or having little energy? Several days [Not at All (0)] : 7.) Trouble concentrating on things, such as reading a newspaper or watching television? Not at all [1/2 of Days or More (2)] : 8.) Moving or speaking so slowly that other people could have noticed, or the opposite, moving or speaking faster than usual? Half the days or more [Moderate] : severity of depression is moderate [Somewhat Difficult] : How difficult have these problems made it for you to do your work, take care of things at home, or get along with people? Somewhat difficult [PHQ-9 Positive] : PHQ-9 Positive [I have developed a follow-up plan documented below in the note.] : I have developed a follow-up plan documented below in the note. [[id number]] : 9

## 2023-07-17 NOTE — HISTORY OF PRESENT ILLNESS
[FreeTextEntry1] : Patient is following up on insomnia. pt notes taking trazadone 50mg, pt notes mood is better, pt could increase med as phq9 was 13 and now is down to 9\par pt is back to work, pt notes some stutter, , pt does not have neurro , would sufggest he see one  [de-identified] : Patient also complains of some angular cheilitis, was using a Profore on it with no relief.

## 2023-07-17 NOTE — REVIEW OF SYSTEMS
[Fever] : no fever [Earache] : no earache [Chest Pain] : no chest pain [Shortness Of Breath] : no shortness of breath [de-identified] : pt notes some times when at work he stutters  not sure what cause is

## 2023-07-17 NOTE — PHYSICAL EXAM
[No Acute Distress] : no acute distress [Clear to Auscultation] : lungs were clear to auscultation bilaterally [Normal S1, S2] : normal S1 and S2 [Soft] : abdomen soft [de-identified] : Evaluation of angle of mouth creases reveals some mild maceration on the right side

## 2023-07-20 ENCOUNTER — TRANSCRIPTION ENCOUNTER (OUTPATIENT)
Age: 23
End: 2023-07-20

## 2023-08-03 ENCOUNTER — NON-APPOINTMENT (OUTPATIENT)
Age: 23
End: 2023-08-03

## 2023-08-03 ENCOUNTER — APPOINTMENT (OUTPATIENT)
Dept: NEUROLOGY | Facility: CLINIC | Age: 23
End: 2023-08-03
Payer: OTHER MISCELLANEOUS

## 2023-08-03 VITALS
WEIGHT: 203 LBS | DIASTOLIC BLOOD PRESSURE: 70 MMHG | HEIGHT: 69 IN | SYSTOLIC BLOOD PRESSURE: 120 MMHG | BODY MASS INDEX: 30.07 KG/M2

## 2023-08-03 DIAGNOSIS — G62.9 POLYNEUROPATHY, UNSPECIFIED: ICD-10-CM

## 2023-08-03 DIAGNOSIS — M25.572 PAIN IN LEFT ANKLE AND JOINTS OF LEFT FOOT: ICD-10-CM

## 2023-08-03 DIAGNOSIS — G89.29 PAIN IN LEFT ANKLE AND JOINTS OF LEFT FOOT: ICD-10-CM

## 2023-08-03 PROCEDURE — 99204 OFFICE O/P NEW MOD 45 MIN: CPT

## 2023-08-03 NOTE — PHYSICAL EXAM
[FreeTextEntry1] : There is no lumbar palpation tenderness. There is full range of movement of the lumbar spine. Straight leg raising is negative bilaterally. No swelling seen of the left foot or ankle No discoloration [General Appearance - Alert] : alert [General Appearance - In No Acute Distress] : in no acute distress [Oriented To Time, Place, And Person] : oriented to person, place, and time [Impaired Insight] : insight and judgment were intact [Affect] : the affect was normal [Person] : oriented to person [Place] : oriented to place [Time] : oriented to time [Concentration Intact] : normal concentrating ability [Fluency] : fluency intact [Comprehension] : comprehension intact [Past History] : adequate knowledge of personal past history [Cranial Nerves Optic (II)] : visual acuity intact bilaterally,  visual fields full to confrontation, pupils equal round and reactive to light [Cranial Nerves Oculomotor (III)] : extraocular motion intact [Cranial Nerves Trigeminal (V)] : facial sensation intact symmetrically [Cranial Nerves Facial (VII)] : face symmetrical [Cranial Nerves Vestibulocochlear (VIII)] : hearing was intact bilaterally [Cranial Nerves Glossopharyngeal (IX)] : tongue and palate midline [Cranial Nerves Accessory (XI - Cranial And Spinal)] : head turning and shoulder shrug symmetric [Cranial Nerves Hypoglossal (XII)] : there was no tongue deviation with protrusion [Motor Tone] : muscle tone was normal in all four extremities [Motor Strength] : muscle strength was normal in all four extremities [No Muscle Atrophy] : normal bulk in all four extremities [Paresis Pronator Drift Right-Sided] : no pronator drift on the right [Paresis Pronator Drift Left-Sided] : no pronator drift on the left [Sensation Tactile Decrease] : light touch was intact [Sensation Pain / Temperature Decrease] : pain and temperature was intact [Proprioception] : proprioception was intact [Romberg's Sign] : Romberg's sign was negtive [Abnormal Walk] : normal gait [Balance] : balance was intact [Past-pointing] : there was no past-pointing [Tremor] : no tremor present [Coordination - Dysmetria Impaired Finger-to-Nose Bilateral] : not present [Coordination - Dysmetria Impaired Heel-to-Shin Bilateral] : not present [2+] : Ankle jerk left 2+ [PERRL With Normal Accommodation] : pupils were equal in size, round, reactive to light, with normal accommodation [Extraocular Movements] : extraocular movements were intact [Full Visual Field] : full visual field

## 2023-08-03 NOTE — HISTORY OF PRESENT ILLNESS
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  He said he suffered a fractured fourth metatarsal and a chipped bone.  Apparently fracture healed as he had an MRI of the left ankle on 12/30/2022 which is reported normal. However, he persists with pain in the left foot.  It will radiate up to the knee.  It is intermittent but there pretty much every day.  He has some occasional low back pain.  He uses ibuprofen with variable relief.  Occasionally the ankle will swell.  He has been following with podiatry and says he has received some injections which did not give any sustained relief.  He returned to work about 2 months ago.  Medical history is significant for factor V Leiden, otherwise negative

## 2023-08-03 NOTE — ASSESSMENT
[FreeTextEntry1] : Chronic left foot pain following a twisting injury as discussed above His neurological examination is normal I am suggesting EMG and nerve conduction studies to see if there is an underlying peripheral neuromuscular issue.

## 2023-09-18 ENCOUNTER — APPOINTMENT (OUTPATIENT)
Dept: FAMILY MEDICINE | Facility: CLINIC | Age: 23
End: 2023-09-18
Payer: MEDICAID

## 2023-09-18 VITALS
HEART RATE: 74 BPM | SYSTOLIC BLOOD PRESSURE: 112 MMHG | BODY MASS INDEX: 29.03 KG/M2 | HEIGHT: 69 IN | WEIGHT: 196 LBS | OXYGEN SATURATION: 95 % | DIASTOLIC BLOOD PRESSURE: 62 MMHG | TEMPERATURE: 97.9 F

## 2023-09-18 DIAGNOSIS — J30.2 OTHER SEASONAL ALLERGIC RHINITIS: ICD-10-CM

## 2023-09-18 DIAGNOSIS — Z87.898 PERSONAL HISTORY OF OTHER SPECIFIED CONDITIONS: ICD-10-CM

## 2023-09-18 DIAGNOSIS — F80.81 CHILDHOOD ONSET FLUENCY DISORDER: ICD-10-CM

## 2023-09-18 PROCEDURE — 99214 OFFICE O/P EST MOD 30 MIN: CPT

## 2023-09-18 RX ORDER — MUPIROCIN 20 MG/G
2 OINTMENT TOPICAL 3 TIMES DAILY
Qty: 1 | Refills: 1 | Status: DISCONTINUED | COMMUNITY
Start: 2022-02-04 | End: 2023-09-18

## 2023-09-18 RX ORDER — HYDROCORTISONE 25 MG/G
2.5 CREAM TOPICAL TWICE DAILY
Qty: 1 | Refills: 1 | Status: DISCONTINUED | COMMUNITY
Start: 2023-02-01 | End: 2023-09-18

## 2023-09-18 RX ORDER — OMEPRAZOLE 20 MG/1
20 CAPSULE, DELAYED RELEASE ORAL
Qty: 30 | Refills: 2 | Status: DISCONTINUED | COMMUNITY
Start: 2022-03-08 | End: 2023-09-18

## 2023-09-20 ENCOUNTER — TRANSCRIPTION ENCOUNTER (OUTPATIENT)
Age: 23
End: 2023-09-20

## 2023-09-26 ENCOUNTER — TRANSCRIPTION ENCOUNTER (OUTPATIENT)
Age: 23
End: 2023-09-26

## 2023-09-27 ENCOUNTER — TRANSCRIPTION ENCOUNTER (OUTPATIENT)
Age: 23
End: 2023-09-27

## 2023-10-24 ENCOUNTER — APPOINTMENT (OUTPATIENT)
Dept: NEUROLOGY | Facility: CLINIC | Age: 23
End: 2023-10-24

## 2023-11-27 ENCOUNTER — APPOINTMENT (OUTPATIENT)
Dept: FAMILY MEDICINE | Facility: CLINIC | Age: 23
End: 2023-11-27
Payer: MEDICAID

## 2023-11-27 ENCOUNTER — LABORATORY RESULT (OUTPATIENT)
Age: 23
End: 2023-11-27

## 2023-11-27 VITALS
WEIGHT: 193 LBS | OXYGEN SATURATION: 96 % | BODY MASS INDEX: 28.58 KG/M2 | HEART RATE: 92 BPM | HEIGHT: 69 IN | DIASTOLIC BLOOD PRESSURE: 78 MMHG | SYSTOLIC BLOOD PRESSURE: 102 MMHG

## 2023-11-27 DIAGNOSIS — F32.A DEPRESSION, UNSPECIFIED: ICD-10-CM

## 2023-11-27 DIAGNOSIS — R11.0 NAUSEA: ICD-10-CM

## 2023-11-27 PROCEDURE — 99214 OFFICE O/P EST MOD 30 MIN: CPT | Mod: 25

## 2023-11-30 LAB
ALMOND IGE QN: <0.1 KUA/L
BRAZIL NUT IGE QN: <0.1 KUA/L
CASHEW NUT IGE QN: <0.1 KUA/L
CODFISH IGE QN: <0.1 KUA/L
COW MILK IGE QN: <0.1 KUA/L
DEPRECATED ALMOND IGE RAST QL: 0 (ref 0–?)
DEPRECATED BRAZIL NUT IGE RAST QL: 0 (ref 0–?)
DEPRECATED CASHEW NUT IGE RAST QL: 0 (ref 0–?)
DEPRECATED CODFISH IGE RAST QL: 0 (ref 0–?)
DEPRECATED COW MILK IGE RAST QL: 0 (ref 0–?)
DEPRECATED EGG WHITE IGE RAST QL: 0 (ref 0–?)
DEPRECATED HAZELNUT IGE RAST QL: 0 (ref 0–?)
DEPRECATED PEANUT IGE RAST QL: 0 (ref 0–?)
DEPRECATED SALMON IGE RAST QL: 0 (ref 0–?)
DEPRECATED SCALLOP IGE RAST QL: <0.1 KUA/L
DEPRECATED SESAME SEED IGE RAST QL: 0 (ref 0–?)
DEPRECATED SHRIMP IGE RAST QL: NORMAL (ref 0–?)
DEPRECATED SOYBEAN IGE RAST QL: 0 (ref 0–?)
DEPRECATED TUNA IGE RAST QL: 0 (ref 0–?)
DEPRECATED WALNUT IGE RAST QL: 0 (ref 0–?)
DEPRECATED WHEAT IGE RAST QL: 0 (ref 0–?)
EGG WHITE IGE QN: <0.1 KUA/L
HAZELNUT IGE QN: <0.1 KUA/L
HCT VFR BLD CALC: 48.6 %
HGB BLD-MCNC: 15.6 G/DL
MCHC RBC-ENTMCNC: 29.7 PG
MCHC RBC-ENTMCNC: 32.1 GM/DL
MCV RBC AUTO: 92.4 FL
PEANUT IGE QN: <0.1 KUA/L
PLATELET # BLD AUTO: 279 K/UL
RBC # BLD: 5.26 M/UL
RBC # FLD: 13 %
SALMON IGE QN: <0.1 KUA/L
SCALLOP IGE QN: 0 (ref 0–?)
SCALLOP IGE QN: 0.1 KUA/L
SESAME SEED IGE QN: <0.1 KUA/L
SOYBEAN IGE QN: <0.1 KUA/L
TOTAL IGE SMQN RAST: 80 KU/L
TUNA IGE QN: <0.1 KUA/L
WALNUT IGE QN: <0.1 KUA/L
WBC # FLD AUTO: 5.99 K/UL
WHEAT IGE QN: <0.1 KUA/L

## 2024-01-09 ENCOUNTER — TRANSCRIPTION ENCOUNTER (OUTPATIENT)
Age: 24
End: 2024-01-09

## 2024-01-10 ENCOUNTER — TRANSCRIPTION ENCOUNTER (OUTPATIENT)
Age: 24
End: 2024-01-10

## 2024-01-16 ENCOUNTER — RX RENEWAL (OUTPATIENT)
Age: 24
End: 2024-01-16

## 2024-01-17 ENCOUNTER — TRANSCRIPTION ENCOUNTER (OUTPATIENT)
Age: 24
End: 2024-01-17

## 2024-01-23 ENCOUNTER — APPOINTMENT (OUTPATIENT)
Dept: PEDIATRIC ALLERGY IMMUNOLOGY | Facility: CLINIC | Age: 24
End: 2024-01-23
Payer: MEDICAID

## 2024-01-23 VITALS
HEART RATE: 80 BPM | WEIGHT: 182.76 LBS | DIASTOLIC BLOOD PRESSURE: 81 MMHG | OXYGEN SATURATION: 98 % | HEIGHT: 69.45 IN | BODY MASS INDEX: 26.76 KG/M2 | SYSTOLIC BLOOD PRESSURE: 124 MMHG

## 2024-01-23 DIAGNOSIS — R11.10 VOMITING, UNSPECIFIED: ICD-10-CM

## 2024-01-23 DIAGNOSIS — L85.3 XEROSIS CUTIS: ICD-10-CM

## 2024-01-23 DIAGNOSIS — J31.0 CHRONIC RHINITIS: ICD-10-CM

## 2024-01-23 PROCEDURE — 95004 PERQ TESTS W/ALRGNC XTRCS: CPT

## 2024-01-23 PROCEDURE — 99204 OFFICE O/P NEW MOD 45 MIN: CPT | Mod: 25

## 2024-01-23 RX ORDER — TRIAMCINOLONE ACETONIDE 55 UG/1
55 SPRAY, METERED NASAL
Qty: 1 | Refills: 2 | Status: ACTIVE | COMMUNITY
Start: 2024-01-23

## 2024-01-23 RX ORDER — CETIRIZINE HYDROCHLORIDE 10 MG/1
10 TABLET, FILM COATED ORAL DAILY
Qty: 30 | Refills: 0 | Status: DISCONTINUED | COMMUNITY
Start: 2019-10-12 | End: 2024-01-23

## 2024-01-23 NOTE — CONSULT LETTER
[Dear  ___] : Dear  [unfilled], [Consult Letter:] : I had the pleasure of evaluating your patient, [unfilled]. [Please see my note below.] : Please see my note below. [Consult Closing:] : Thank you very much for allowing me to participate in the care of this patient.  If you have any questions, please do not hesitate to contact me. [Sincerely,] : Sincerely, [FreeTextEntry3] : Pamella Rodrigues MD Attending Physician, Division of Allergy and Immunology , Departments of Medicine and Pediatrics [employer] [facility]

## 2024-01-23 NOTE — HISTORY OF PRESENT ILLNESS
[Asthma] : asthma [Venom Reactions] : venom reactions [Drug Allergies] : drug allergies [de-identified] : 23 year old male presents for an allergy evaluation.  Patient reports symptoms of rhinitis with congestion, runny nose, postnasal drip. He has tried Benadryl and Zyrtec before with limited symptom relief. Once tried Flonase, taste bothered him and made him gag. He has cat and dog exposure at home. He is also worried about a possible dairy allergy due to symptoms of bloating and abdominal discomfort at times. He has an upcoming appointment with GI for additional h/o nausea and vomiting that have not been associated with any specific food intake. Has h/o constipation.  No h/o rashes, hives, respiratory symptoms. ImmunoCap/food specific IgE testing from 11/27/23 was negative to milk, egg white, peanut, soy, wheat, codfish, salmon, tuna, sesame, hazelnut, brazil nut, almond, walnut, cashew, scallop, shrimp. He has dry skin, doesn't use any moisturizers.

## 2024-01-23 NOTE — IMPRESSION
[Allergy Testing Dust Mite] : dust mites [Allergy Testing Mixed Feathers] : feathers [Allergy Testing Cockroach] : cockroach [Allergy Testing Dog] : dog [Allergy Testing Cat] : cat [Allergy Testing Trees] : trees [Allergy Testing Weeds] : weeds [Allergy Testing Grasses] : grasses [] : milk

## 2024-01-23 NOTE — PHYSICAL EXAM
[Alert] : alert [Well Nourished] : well nourished [Healthy Appearance] : healthy appearance [No Acute Distress] : no acute distress [Well Developed] : well developed [No Discharge] : no discharge [Normal Pupil & Iris Size/Symmetry] : normal pupil and iris size and symmetry [No Photophobia] : no photophobia [Sclera Not Icteric] : sclera not icteric [Normal Lips/Tongue] : the lips and tongue were normal [Normal Outer Ear/Nose] : the ears and nose were normal in appearance [No Nasal Discharge] : no nasal discharge [Supple] : the neck was supple [Normal Rate and Effort] : normal respiratory rhythm and effort [No Retractions] : no retractions [No Crackles] : no crackles [Bilateral Audible Breath Sounds] : bilateral audible breath sounds [Normal Rate] : heart rate was normal  [Normal S1, S2] : normal S1 and S2 [Regular Rhythm] : with a regular rhythm [No murmur] : no murmur [No Rash] : no rash [Skin Intact] : skin intact  [No Skin Lesions] : no skin lesions [No Cyanosis] : no cyanosis [Normal Mood] : mood was normal [Normal Affect] : affect was normal [Alert, Awake, Oriented as Age-Appropriate] : alert, awake, oriented as age appropriate [Wheezing] : no wheezing was heard [Urticaria] : no urticaria [de-identified] : dry skin

## 2024-01-23 NOTE — REVIEW OF SYSTEMS
[Rhinorrhea] : rhinorrhea [Nasal Congestion] : nasal congestion [Sneezing] : sneezing [Nausea] : nausea [Vomiting] : vomiting [Abdominal Pain] : abdominal pain [Dry Skin] : ~L dry skin [Nl] : Genitourinary

## 2024-01-29 ENCOUNTER — RX RENEWAL (OUTPATIENT)
Age: 24
End: 2024-01-29

## 2024-01-31 ENCOUNTER — APPOINTMENT (OUTPATIENT)
Dept: FAMILY MEDICINE | Facility: CLINIC | Age: 24
End: 2024-01-31
Payer: MEDICAID

## 2024-01-31 VITALS
HEART RATE: 71 BPM | SYSTOLIC BLOOD PRESSURE: 110 MMHG | DIASTOLIC BLOOD PRESSURE: 80 MMHG | HEIGHT: 69 IN | OXYGEN SATURATION: 98 %

## 2024-01-31 DIAGNOSIS — R53.83 OTHER FATIGUE: ICD-10-CM

## 2024-01-31 DIAGNOSIS — R09.82 POSTNASAL DRIP: ICD-10-CM

## 2024-01-31 DIAGNOSIS — F41.8 OTHER SPECIFIED ANXIETY DISORDERS: ICD-10-CM

## 2024-01-31 PROCEDURE — G2211 COMPLEX E/M VISIT ADD ON: CPT | Mod: NC,1L

## 2024-01-31 PROCEDURE — 99214 OFFICE O/P EST MOD 30 MIN: CPT

## 2024-01-31 NOTE — PLAN
[FreeTextEntry1] : gi fu is set ,  ent eval for pnd and allergy p;t has needed for long time to see psych and has recently starte w them - pt aware im not changing , pt has appt upcoming next week will ask her   pt neuropthy is worst after work , pt to discuss comorbid  condtions., pt was  mentioned to do seroguel  pt notes decreased appetiteand weight is down, pt needs topsh self to eat

## 2024-01-31 NOTE — HISTORY OF PRESENT ILLNESS
[FreeTextEntry1] : Pt following up on depression and anxiety.  pt w long standing hx of lack of fu w referred specialists  , pt notes pt did phq9 was 13 today as was june  pt saw layla who rec pt to see gi to do hydrogen breath test, pt reports when he saw them he said they rec ent for pnd,  [de-identified] : -pt want tro know about seroguel , pt aware im not a psych and that he has been referred multiple times and needs to get in to one, will rec contbehaviral eal

## 2024-02-08 LAB
25(OH)D3 SERPL-MCNC: 25.2 NG/ML
ANION GAP SERPL CALC-SCNC: 12 MMOL/L
BUN SERPL-MCNC: 8 MG/DL
CALCIUM SERPL-MCNC: 9.7 MG/DL
CHLORIDE SERPL-SCNC: 104 MMOL/L
CO2 SERPL-SCNC: 26 MMOL/L
CREAT SERPL-MCNC: 0.93 MG/DL
EGFR: 120 ML/MIN/1.73M2
GLUCOSE SERPL-MCNC: 85 MG/DL
POTASSIUM SERPL-SCNC: 4.9 MMOL/L
SODIUM SERPL-SCNC: 142 MMOL/L
TSH SERPL-ACNC: 0.81 UIU/ML
VIT B12 SERPL-MCNC: 244 PG/ML

## 2024-02-15 ENCOUNTER — APPOINTMENT (OUTPATIENT)
Dept: GASTROENTEROLOGY | Facility: CLINIC | Age: 24
End: 2024-02-15
Payer: MEDICAID

## 2024-02-15 VITALS
DIASTOLIC BLOOD PRESSURE: 82 MMHG | HEIGHT: 69 IN | RESPIRATION RATE: 14 BRPM | SYSTOLIC BLOOD PRESSURE: 124 MMHG | WEIGHT: 184 LBS | OXYGEN SATURATION: 98 % | HEART RATE: 69 BPM | TEMPERATURE: 97.8 F | BODY MASS INDEX: 27.25 KG/M2

## 2024-02-15 DIAGNOSIS — R11.2 NAUSEA WITH VOMITING, UNSPECIFIED: ICD-10-CM

## 2024-02-15 DIAGNOSIS — R63.0 ANOREXIA: ICD-10-CM

## 2024-02-15 DIAGNOSIS — Z78.9 OTHER SPECIFIED HEALTH STATUS: ICD-10-CM

## 2024-02-15 DIAGNOSIS — R63.4 ABNORMAL WEIGHT LOSS: ICD-10-CM

## 2024-02-15 PROCEDURE — 99214 OFFICE O/P EST MOD 30 MIN: CPT

## 2024-02-15 PROCEDURE — 99204 OFFICE O/P NEW MOD 45 MIN: CPT

## 2024-02-15 NOTE — ASSESSMENT
[FreeTextEntry1] : Patient is a 23 year old male, with PMH of depression and anxiety, who presents for evaluation of nausea, vomiting x 1 year with associated decreased appetite, anorexia and weight loss.   Continue PPI daily. EGD to be scheduled to r/o gastritis, esophagitis, Tran's Esophagus, or PUD. Indication, risks and benefit of EGD discussed with patient in detail. All questions answered. Patient is medically optimized for EGD.

## 2024-02-15 NOTE — REASON FOR VISIT
[Consultation] : a consultation visit [Parent] : parent [FreeTextEntry1] : nausea, vomiting, weight loss, decreased appetite

## 2024-02-15 NOTE — PHYSICAL EXAM
[Alert] : alert [Normal Voice/Communication] : normal voice/communication [Healthy Appearing] : healthy appearing [No Acute Distress] : no acute distress [Sclera] : the sclera and conjunctiva were normal [Hearing Threshold Finger Rub Not [place]] : hearing was normal [Normal Lips/Gums] : the lips and gums were normal [Normal Appearance] : the appearance of the neck was normal [No Neck Mass] : no neck mass was observed [No Respiratory Distress] : no respiratory distress [No Acc Muscle Use] : no accessory muscle use [Bowel Sounds] : normal bowel sounds [Abdomen Tenderness] : non-tender [No Masses] : no abdominal mass palpated [Abdomen Soft] : soft [] : no hepatosplenomegaly [Oriented To Time, Place, And Person] : oriented to person, place, and time

## 2024-02-15 NOTE — HISTORY OF PRESENT ILLNESS
[FreeTextEntry1] : Patient is a 23 year old male, with PMH of depression and anxiety, who presents for evaluation of nausea, vomiting x 1 year with associated decreased appetite, anorexia and weight loss.   Pt states his symptoms started about 4 years ago, worsened over the last 1 year. He has been on PPI, Omeprazole 40mg PO QD, for 1 year with overall good relief but when he runs out/stops taking it, he has persistent nausea, vomiting. He has had episodes of significant weight loss. States he lost 20lbs in 1 week on two separate occasions due to not eating and vomiting.   He has been evaluated by allergist in the past, no IgE mediated food allergy found. He does have diarrhea with large amount of dairy.   He does use THC edibles daily x 1 month for joint pain but states his GI symptoms have been going on for 1 michael, longer than his marijuana exposure. Previously, he used to smoke 1-2x/month. He denies alcohol use or tobacco use.   He has intermittent diarrhea and constipation, some bloating noted in PCP and allergist note.   Patient denies any significant cardiac or pulmonary conditions.   Patient denies dysphagia, rectal bleeding.

## 2024-03-04 ENCOUNTER — RX RENEWAL (OUTPATIENT)
Age: 24
End: 2024-03-04

## 2024-03-21 NOTE — BRIEF OPERATIVE NOTE - ELECTIVE PROCEDURE
Initiate Treatment: ketoconazole 2 % topical cream - Apply to affected areas in groin twice daily x 4 weeks then as needed for itch/ irritation\\n\\nDiflucan 200 mg tablet - Take one pill by mouth twice a week for one week\\n\\ndoxycycline hyclate 100 mg capsule - Take one tablet twice a day for a week Detail Level: Zone No Render In Strict Bullet Format?: No

## 2024-04-09 ENCOUNTER — RX RENEWAL (OUTPATIENT)
Age: 24
End: 2024-04-09

## 2024-04-09 RX ORDER — OMEPRAZOLE 40 MG/1
40 CAPSULE, DELAYED RELEASE ORAL
Qty: 30 | Refills: 2 | Status: ACTIVE | COMMUNITY
Start: 2022-12-31 | End: 1900-01-01

## 2024-04-18 ENCOUNTER — TRANSCRIPTION ENCOUNTER (OUTPATIENT)
Age: 24
End: 2024-04-18

## 2024-04-19 ENCOUNTER — RESULT REVIEW (OUTPATIENT)
Age: 24
End: 2024-04-19

## 2024-04-19 ENCOUNTER — APPOINTMENT (OUTPATIENT)
Dept: GASTROENTEROLOGY | Facility: GI CENTER | Age: 24
End: 2024-04-19
Payer: MEDICAID

## 2024-04-19 ENCOUNTER — OUTPATIENT (OUTPATIENT)
Dept: OUTPATIENT SERVICES | Facility: HOSPITAL | Age: 24
LOS: 1 days | End: 2024-04-19
Payer: COMMERCIAL

## 2024-04-19 DIAGNOSIS — R11.2 NAUSEA WITH VOMITING, UNSPECIFIED: ICD-10-CM

## 2024-04-19 DIAGNOSIS — R10.0 ACUTE ABDOMEN: ICD-10-CM

## 2024-04-19 DIAGNOSIS — Z98.890 OTHER SPECIFIED POSTPROCEDURAL STATES: Chronic | ICD-10-CM

## 2024-04-19 PROCEDURE — 88305 TISSUE EXAM BY PATHOLOGIST: CPT | Mod: 26

## 2024-04-19 PROCEDURE — 88342 IMHCHEM/IMCYTCHM 1ST ANTB: CPT | Mod: 26

## 2024-04-19 PROCEDURE — 43239 EGD BIOPSY SINGLE/MULTIPLE: CPT

## 2024-04-19 PROCEDURE — 88305 TISSUE EXAM BY PATHOLOGIST: CPT

## 2024-04-19 PROCEDURE — 88342 IMHCHEM/IMCYTCHM 1ST ANTB: CPT

## 2024-04-25 LAB — SURGICAL PATHOLOGY STUDY: SIGNIFICANT CHANGE UP

## 2024-05-10 ENCOUNTER — RX RENEWAL (OUTPATIENT)
Age: 24
End: 2024-05-10

## 2024-05-21 ENCOUNTER — APPOINTMENT (OUTPATIENT)
Dept: FAMILY MEDICINE | Facility: CLINIC | Age: 24
End: 2024-05-21
Payer: MEDICAID

## 2024-05-21 VITALS
OXYGEN SATURATION: 98 % | WEIGHT: 184 LBS | DIASTOLIC BLOOD PRESSURE: 80 MMHG | HEIGHT: 69 IN | HEART RATE: 70 BPM | SYSTOLIC BLOOD PRESSURE: 102 MMHG | BODY MASS INDEX: 27.25 KG/M2

## 2024-05-21 DIAGNOSIS — K29.70 GASTRITIS, UNSPECIFIED, W/OUT BLEEDING: ICD-10-CM

## 2024-05-21 DIAGNOSIS — F41.9 ANXIETY DISORDER, UNSPECIFIED: ICD-10-CM

## 2024-05-21 DIAGNOSIS — E53.8 DEFICIENCY OF OTHER SPECIFIED B GROUP VITAMINS: ICD-10-CM

## 2024-05-21 DIAGNOSIS — E55.9 VITAMIN D DEFICIENCY, UNSPECIFIED: ICD-10-CM

## 2024-05-21 PROCEDURE — 99214 OFFICE O/P EST MOD 30 MIN: CPT | Mod: 25

## 2024-05-21 NOTE — DATA REVIEWED
[FreeTextEntry1] : vit d  25 and b12 was 222 endoscopy- - had 4/24, pt adviosed to call as not heard from gi, pt was   on omeproazole lead up to that

## 2024-05-21 NOTE — PLAN
[FreeTextEntry1] : pt need smg as on ppi, will also set up appt for cpe as not booked for today pt has met w psych and therpaist yesterday , was prescribed  a med pt to fu psych, radhaerns w hair loss will nichole , will do chol at fu

## 2024-05-21 NOTE — HISTORY OF PRESENT ILLNESS
[FreeTextEntry1] : Pt following up on health concerns. pt notes saw gi, pt had labs in jan aned b12 and d were low, pt despite tell mother to od d and b12 did not  [de-identified] : stomach has been better, but on antacid now at nigght,

## 2024-05-22 LAB — MAGNESIUM SERPL-MCNC: 2 MG/DL

## 2024-05-30 LAB — ANA SER IF-ACNC: NEGATIVE

## 2024-06-07 ENCOUNTER — RX RENEWAL (OUTPATIENT)
Age: 24
End: 2024-06-07

## 2024-06-07 RX ORDER — TRAZODONE HYDROCHLORIDE 100 MG/1
100 TABLET ORAL
Qty: 30 | Refills: 0 | Status: ACTIVE | COMMUNITY
Start: 2023-06-01 | End: 1900-01-01

## 2024-07-09 ENCOUNTER — RX RENEWAL (OUTPATIENT)
Age: 24
End: 2024-07-09

## 2024-07-16 ENCOUNTER — APPOINTMENT (OUTPATIENT)
Dept: ORTHOPEDIC SURGERY | Facility: CLINIC | Age: 24
End: 2024-07-16

## 2024-07-22 ENCOUNTER — APPOINTMENT (OUTPATIENT)
Dept: FAMILY MEDICINE | Facility: CLINIC | Age: 24
End: 2024-07-22
Payer: MEDICAID

## 2024-07-22 VITALS
BODY MASS INDEX: 28.64 KG/M2 | WEIGHT: 193.4 LBS | TEMPERATURE: 97.8 F | RESPIRATION RATE: 17 BRPM | DIASTOLIC BLOOD PRESSURE: 70 MMHG | SYSTOLIC BLOOD PRESSURE: 120 MMHG | HEIGHT: 69 IN | OXYGEN SATURATION: 96 % | HEART RATE: 90 BPM

## 2024-07-22 DIAGNOSIS — G43.909 MIGRAINE, UNSPECIFIED, NOT INTRACTABLE, W/OUT STATUS MIGRAINOSUS: ICD-10-CM

## 2024-07-22 DIAGNOSIS — G47.00 INSOMNIA, UNSPECIFIED: ICD-10-CM

## 2024-07-22 DIAGNOSIS — F41.8 OTHER SPECIFIED ANXIETY DISORDERS: ICD-10-CM

## 2024-07-22 PROCEDURE — 99214 OFFICE O/P EST MOD 30 MIN: CPT

## 2024-07-22 RX ORDER — FLUOXETINE HYDROCHLORIDE 20 MG/1
20 CAPSULE ORAL
Qty: 30 | Refills: 2 | Status: ACTIVE | COMMUNITY
Start: 2024-07-22

## 2024-07-22 NOTE — DATA REVIEWED
[FreeTextEntry1] : PHQ-9-was 13 back in January, and since has started on fluoxetine most recently increased 1 week ago to 20 mg

## 2024-07-22 NOTE — HISTORY OF PRESENT ILLNESS
[FreeTextEntry8] : pt have problem fall  asleep, pt also notes stay asleep, pt is on trazadone  100mg at bed , pt notes regarding  dsepression is seeing psych and is on fluoxetine ,pt is on trazaone,  pt has hx of mgrained, no more eye pain , no fever pt tawke s b12 and vit d  pt does smoke some weed

## 2024-07-22 NOTE — PLAN
[FreeTextEntry1] : Patient with recent increase in fluoxetine to 20 mg 1 week ago, regarding insomnia though there is some underlying, oftentimes his association with depression which given med was increased only 1 week ago will give it another couple weeks to see if that improves as would expected to be helpful, so now continue fluoxetine 20 as well as the trazodone 100. Patient to follow with psychiatry will pickering  mod risk given need for refills ,medical conditions and their comorbidities, side effects of meds  explained and risk of non compliance discussed pt has cpe this week , aleyda do labs prir to visit

## 2024-07-26 ENCOUNTER — APPOINTMENT (OUTPATIENT)
Dept: FAMILY MEDICINE | Facility: CLINIC | Age: 24
End: 2024-07-26

## 2024-07-26 ENCOUNTER — TRANSCRIPTION ENCOUNTER (OUTPATIENT)
Age: 24
End: 2024-07-26

## 2024-08-06 ENCOUNTER — RX RENEWAL (OUTPATIENT)
Age: 24
End: 2024-08-06

## 2024-08-20 ENCOUNTER — NON-APPOINTMENT (OUTPATIENT)
Age: 24
End: 2024-08-20

## 2024-08-20 DIAGNOSIS — Z86.2 PERSONAL HISTORY OF DISEASES OF THE BLOOD AND BLOOD-FORMING ORGANS AND CERTAIN DISORDERS INVOLVING THE IMMUNE MECHANISM: ICD-10-CM

## 2024-08-20 DIAGNOSIS — G57.50 TARSAL TUNNEL SYNDROME, UNSPECIFIED LOWER LIMB: ICD-10-CM

## 2024-08-20 DIAGNOSIS — M76.822 POSTERIOR TIBIAL TENDINITIS, LEFT LEG: ICD-10-CM

## 2024-08-20 DIAGNOSIS — S93.409A SPRAIN OF UNSPECIFIED LIGAMENT OF UNSPECIFIED ANKLE, INITIAL ENCOUNTER: ICD-10-CM

## 2024-08-20 DIAGNOSIS — S92.345A NONDISPLACED FRACTURE OF FOURTH METATARSAL BONE, LEFT FOOT, INITIAL ENCOUNTER FOR CLOSED FRACTURE: ICD-10-CM

## 2024-09-07 ENCOUNTER — RX RENEWAL (OUTPATIENT)
Age: 24
End: 2024-09-07

## 2024-09-09 ENCOUNTER — APPOINTMENT (OUTPATIENT)
Age: 24
End: 2024-09-09
Payer: OTHER MISCELLANEOUS

## 2024-09-09 ENCOUNTER — NON-APPOINTMENT (OUTPATIENT)
Age: 24
End: 2024-09-09

## 2024-09-09 VITALS — WEIGHT: 195 LBS | HEIGHT: 69 IN | BODY MASS INDEX: 28.88 KG/M2

## 2024-09-09 DIAGNOSIS — R63.0 ANOREXIA: ICD-10-CM

## 2024-09-09 DIAGNOSIS — M79.2 NEURALGIA AND NEURITIS, UNSPECIFIED: ICD-10-CM

## 2024-09-09 DIAGNOSIS — M76.60 ACHILLES TENDINITIS, UNSPECIFIED LEG: ICD-10-CM

## 2024-09-09 PROCEDURE — 99203 OFFICE O/P NEW LOW 30 MIN: CPT

## 2024-09-09 NOTE — HISTORY OF PRESENT ILLNESS
[Sneakers] : efra [FreeTextEntry1] : On 9/17/2022 he was working at [employer] stepped down from a ladder and twisted the left foot.  He states he heard a pop, felt pain and the foot swelled up.  Treatment has included cortisone injections, nerve blocks and physical therapy which did help.  Patient is full weight bearing in a sneaker and at work with weight limit of 40 lbs since July 2023.     Patient states he has not been to PT for a few months.  Patient has returned back into work.  Patient is still having pain on his left ankle going up to his knee.

## 2024-09-09 NOTE — ASSESSMENT
[FreeTextEntry1] : Patient states he is doing better he is able to stand for longer periods of time and he is having less symptoms.  He is aware of the Achilles tendon and will start stretching as part of his new workout at the gym.  The patient was given a letter today to update his job that he is able to now stand for longer periods of time which was sent to his portal.  And he will continue to follow-up with me in 1 month

## 2024-09-09 NOTE — PHYSICAL EXAM
[General Appearance - Alert] : alert [General Appearance - In No Acute Distress] : in no acute distress [Skin Color & Pigmentation] : normal skin color and pigmentation [Ankle Swelling (On Exam)] : not present [Varicose Veins Of Lower Extremities] : not present [] : not present [Delayed in the Right Toes] : capillary refills normal in right toes [Delayed in the Left Toes] : capillary refills normal in the left toes [FreeTextEntry3] : normal palpable PT and DP pulses [de-identified] : minimal discomfort at the achilles insertion.  tendon itact

## 2024-10-01 ENCOUNTER — RX CHANGE (OUTPATIENT)
Age: 24
End: 2024-10-01

## 2024-10-18 ENCOUNTER — RX CHANGE (OUTPATIENT)
Age: 24
End: 2024-10-18

## 2024-10-22 ENCOUNTER — RX RENEWAL (OUTPATIENT)
Age: 24
End: 2024-10-22

## 2024-11-04 ENCOUNTER — RX CHANGE (OUTPATIENT)
Age: 24
End: 2024-11-04

## 2024-11-11 ENCOUNTER — APPOINTMENT (OUTPATIENT)
Age: 24
End: 2024-11-11

## 2024-11-12 ENCOUNTER — APPOINTMENT (OUTPATIENT)
Age: 24
End: 2024-11-12
Payer: OTHER MISCELLANEOUS

## 2024-11-12 PROCEDURE — 99213 OFFICE O/P EST LOW 20 MIN: CPT

## 2024-11-12 RX ORDER — LIDOCAINE AND PRILOCAINE 25; 25 MG/G; MG/G
2.5-2.5 CREAM TOPICAL
Qty: 1 | Refills: 2 | Status: ACTIVE | COMMUNITY
Start: 2024-11-12 | End: 1900-01-01

## 2024-11-21 ENCOUNTER — RX RENEWAL (OUTPATIENT)
Age: 24
End: 2024-11-21

## 2025-01-14 ENCOUNTER — APPOINTMENT (OUTPATIENT)
Age: 25
End: 2025-01-14
Payer: OTHER MISCELLANEOUS

## 2025-01-14 DIAGNOSIS — M79.2 NEURALGIA AND NEURITIS, UNSPECIFIED: ICD-10-CM

## 2025-01-14 PROCEDURE — 99213 OFFICE O/P EST LOW 20 MIN: CPT

## 2025-02-25 ENCOUNTER — APPOINTMENT (OUTPATIENT)
Age: 25
End: 2025-02-25
Payer: OTHER MISCELLANEOUS

## 2025-02-25 DIAGNOSIS — G89.29 PAIN IN LEFT ANKLE AND JOINTS OF LEFT FOOT: ICD-10-CM

## 2025-02-25 DIAGNOSIS — M25.572 PAIN IN LEFT ANKLE AND JOINTS OF LEFT FOOT: ICD-10-CM

## 2025-02-25 DIAGNOSIS — M76.822 POSTERIOR TIBIAL TENDINITIS, LEFT LEG: ICD-10-CM

## 2025-02-25 PROCEDURE — 99214 OFFICE O/P EST MOD 30 MIN: CPT

## 2025-03-18 ENCOUNTER — APPOINTMENT (OUTPATIENT)
Age: 25
End: 2025-03-18
Payer: OTHER MISCELLANEOUS

## 2025-03-18 DIAGNOSIS — M79.2 NEURALGIA AND NEURITIS, UNSPECIFIED: ICD-10-CM

## 2025-03-18 PROCEDURE — 99213 OFFICE O/P EST LOW 20 MIN: CPT

## 2025-03-27 ENCOUNTER — NON-APPOINTMENT (OUTPATIENT)
Age: 25
End: 2025-03-27

## 2025-03-29 ENCOUNTER — APPOINTMENT (OUTPATIENT)
Age: 25
End: 2025-03-29
Payer: OTHER MISCELLANEOUS

## 2025-03-29 PROCEDURE — 99213 OFFICE O/P EST LOW 20 MIN: CPT

## 2025-04-29 ENCOUNTER — APPOINTMENT (OUTPATIENT)
Age: 25
End: 2025-04-29
Payer: OTHER MISCELLANEOUS

## 2025-04-29 DIAGNOSIS — G62.9 POLYNEUROPATHY, UNSPECIFIED: ICD-10-CM

## 2025-04-29 PROCEDURE — 99213 OFFICE O/P EST LOW 20 MIN: CPT

## 2025-06-10 ENCOUNTER — APPOINTMENT (OUTPATIENT)
Age: 25
End: 2025-06-10
Payer: OTHER MISCELLANEOUS

## 2025-06-10 PROCEDURE — 99213 OFFICE O/P EST LOW 20 MIN: CPT

## (undated) DEVICE — DRAPE TOWEL BLUE 17" X 24"

## (undated) DEVICE — DRSG STERISTRIPS 0.5X4"

## (undated) DEVICE — STAPLER SKIN PROXIMATE

## (undated) DEVICE — BLADE SAFETY LOCK #15

## (undated) DEVICE — GLV 7.5 PROTEXIS

## (undated) DEVICE — MEDICATION LABELS W MARKER

## (undated) DEVICE — SUT MONOCRYL 3-0 27" PS-2 UNDYED

## (undated) DEVICE — SOL IRR POUR H2O 250ML

## (undated) DEVICE — DRSG TELFA 3 X 8

## (undated) DEVICE — DRAPE INSTRUMENT POUCH

## (undated) DEVICE — WRAP COMPRESSION CALF MED

## (undated) DEVICE — PACK MINOR

## (undated) DEVICE — GLV 7 PROTEXIS

## (undated) DEVICE — KIT DEFENDO 4 OLY 4 PC

## (undated) DEVICE — POSITIONER PATIENT SAFETY STRAP 3X60"

## (undated) DEVICE — ELCTR EDGE BOVIE INSULATED BLADE TIP 2.75"

## (undated) DEVICE — CONTAINER SPECIMEN 100ML

## (undated) DEVICE — LIGASURE SM JAW 16.5MM 18CM

## (undated) DEVICE — FORCEP ENDOJAW AGTR LC W NDL 2.8MM 230CM DISP

## (undated) DEVICE — SUT MONOCRYL 4-0 27" PS-2 UNDYED

## (undated) DEVICE — SUT VICRYL 2-0 54" REEL UNDYED

## (undated) DEVICE — DRSG MASTISOL

## (undated) DEVICE — BLANKET WARMER LOWER ADULT

## (undated) DEVICE — SOL IRR POUR NS 0.9% 500ML